# Patient Record
Sex: MALE | Race: WHITE | Employment: FULL TIME | URBAN - METROPOLITAN AREA
[De-identification: names, ages, dates, MRNs, and addresses within clinical notes are randomized per-mention and may not be internally consistent; named-entity substitution may affect disease eponyms.]

---

## 2020-01-31 ENCOUNTER — HOSPITAL ENCOUNTER (OUTPATIENT)
Age: 65
Discharge: HOME | End: 2020-01-31
Payer: COMMERCIAL

## 2020-01-31 LAB — PROSTATE SPECIFIC ANTIGEN: 4.89 NG/ML (ref 0–4)

## 2020-01-31 PROCEDURE — 36415 COLL VENOUS BLD VENIPUNCTURE: CPT

## 2020-01-31 PROCEDURE — 84153 ASSAY OF PSA TOTAL: CPT

## 2021-01-29 ENCOUNTER — HOSPITAL ENCOUNTER (OUTPATIENT)
Age: 66
Discharge: HOME | End: 2021-01-29
Payer: COMMERCIAL

## 2021-01-29 PROCEDURE — G0103 PSA SCREENING: HCPCS

## 2021-01-29 PROCEDURE — 84153 ASSAY OF PSA TOTAL: CPT

## 2021-01-29 PROCEDURE — 36415 COLL VENOUS BLD VENIPUNCTURE: CPT

## 2021-02-02 LAB
PROSTATE SPECIFIC ANTIGEN: 5.07 NG/ML (ref 0–4)
PROSTATE SPECIFIC ANTIGEN: ABNORMAL NG/ML (ref 0–4)

## 2021-05-07 ENCOUNTER — OFFICE VISIT (OUTPATIENT)
Dept: FAMILY MEDICINE CLINIC | Age: 66
End: 2021-05-07
Payer: COMMERCIAL

## 2021-05-07 VITALS
WEIGHT: 198 LBS | RESPIRATION RATE: 17 BRPM | TEMPERATURE: 96.8 F | DIASTOLIC BLOOD PRESSURE: 82 MMHG | HEART RATE: 71 BPM | BODY MASS INDEX: 28.35 KG/M2 | HEIGHT: 70 IN | SYSTOLIC BLOOD PRESSURE: 138 MMHG | OXYGEN SATURATION: 98 %

## 2021-05-07 DIAGNOSIS — I80.9 SUPERFICIAL PHLEBITIS: Primary | ICD-10-CM

## 2021-05-07 DIAGNOSIS — Z23 NEED FOR TETANUS BOOSTER: ICD-10-CM

## 2021-05-07 DIAGNOSIS — R58 ECCHYMOSIS OF FOREARM: ICD-10-CM

## 2021-05-07 PROCEDURE — G8427 DOCREV CUR MEDS BY ELIG CLIN: HCPCS | Performed by: PHYSICIAN ASSISTANT

## 2021-05-07 PROCEDURE — 99213 OFFICE O/P EST LOW 20 MIN: CPT | Performed by: PHYSICIAN ASSISTANT

## 2021-05-07 PROCEDURE — G8419 CALC BMI OUT NRM PARAM NOF/U: HCPCS | Performed by: PHYSICIAN ASSISTANT

## 2021-05-07 PROCEDURE — 1036F TOBACCO NON-USER: CPT | Performed by: PHYSICIAN ASSISTANT

## 2021-05-07 PROCEDURE — 3017F COLORECTAL CA SCREEN DOC REV: CPT | Performed by: PHYSICIAN ASSISTANT

## 2021-05-07 SDOH — ECONOMIC STABILITY: TRANSPORTATION INSECURITY
IN THE PAST 12 MONTHS, HAS LACK OF TRANSPORTATION KEPT YOU FROM MEETINGS, WORK, OR FROM GETTING THINGS NEEDED FOR DAILY LIVING?: NO

## 2021-05-07 SDOH — HEALTH STABILITY: MENTAL HEALTH: HOW MANY STANDARD DRINKS CONTAINING ALCOHOL DO YOU HAVE ON A TYPICAL DAY?: NOT ASKED

## 2021-05-07 SDOH — HEALTH STABILITY: MENTAL HEALTH: HOW OFTEN DO YOU HAVE A DRINK CONTAINING ALCOHOL?: NEVER

## 2021-05-07 SDOH — ECONOMIC STABILITY: INCOME INSECURITY: HOW HARD IS IT FOR YOU TO PAY FOR THE VERY BASICS LIKE FOOD, HOUSING, MEDICAL CARE, AND HEATING?: NOT HARD AT ALL

## 2021-05-07 SDOH — ECONOMIC STABILITY: FOOD INSECURITY: WITHIN THE PAST 12 MONTHS, YOU WORRIED THAT YOUR FOOD WOULD RUN OUT BEFORE YOU GOT MONEY TO BUY MORE.: NEVER TRUE

## 2021-05-07 ASSESSMENT — PATIENT HEALTH QUESTIONNAIRE - PHQ9
1. LITTLE INTEREST OR PLEASURE IN DOING THINGS: 0
2. FEELING DOWN, DEPRESSED OR HOPELESS: 0

## 2021-05-07 NOTE — PATIENT INSTRUCTIONS
Patient Education        Superficial Thrombophlebitis: Care Instructions  Your Care Instructions  Superficial thrombophlebitis is inflammation in a vein where a blood clot has formed close to the surface of the skin. You may be able to feel the clot as a firm lump under the skin. The skin over the clot can become red, tender, and warm to the touch. Blood clots in veins close to the skin's surface usually are not serious and often can be treated at home. Sometimes superficial thrombophlebitis spreads to a deeper vein (deep vein thrombosis, or DVT). These deeper clots can be serious, even life-threatening. It is very important that you follow your doctor's instructions, keep all follow-up appointments, and watch for new or worsening symptoms of a clot. Follow-up care is a key part of your treatment and safety. Be sure to make and go to all appointments, and call your doctor if you are having problems. It's also a good idea to know your test results and keep a list of the medicines you take. How can you care for yourself at home? · Take your medicines exactly as prescribed. Call your doctor if you think you are having a problem with your medicine. You will get more details on the specific medicines your doctor prescribes. · Prop up the sore leg or arm on a pillow anytime you sit or lie down. Try to keep it above the level of your heart. To prevent thrombophlebitis  · Exercise. Keep blood moving in your legs to keep new clots from forming. · Get up out of bed as soon as possible after an illness or surgery. · Do not smoke. If you need help quitting, talk to your doctor about stop-smoking programs and medicines. These can increase your chances of quitting for good. · Ask your doctor about compression stockings. These may help prevent blood clots from forming in your legs. But there are different types of stockings, and they need to fit right. So your doctor will recommend what you need.   When should you call for help? Call 911 anytime you think you may need emergency care. For example, call if:    · You have sudden chest pain and shortness of breath, or you cough up blood.     · You vomit blood or what looks like coffee grounds.     · You pass maroon or very bloody stools.     · You passed out (lost consciousness). Call your doctor now or seek immediate medical care if:    · You have signs of a blood clot, such as:  ? Pain in your calf, back of the knee, thigh, or groin. ? Redness and swelling in your leg or groin.     · You notice a new hard, red, or tender area in your leg.     · Your stools are black and tarlike or have streaks of blood. Watch closely for changes in your health, and be sure to contact your doctor if:    · You do not get better as expected. Where can you learn more? Go to https://[email or web address].[email or web address]. org and sign in to your [employer] account. Enter [phone] in the Script[employer] box to learn more about \"Superficial Thrombophlebitis: Care Instructions. \"     If you do not have an account, please click on the \"Sign Up Now\" link. Current as of: March 4, 2020               Content Version: 12.8  © 2006-2021 [employer]. Care instructions adapted under license by [facility] ([facility]). If you have questions about a medical condition or this instruction, always ask your healthcare professional. [place] [address] any warranty or liability for your use of this information.

## 2021-05-07 NOTE — PROGRESS NOTES
1. Have you been to the ER, urgent care clinic since your last visit? Hospitalized since your last visit? No    2. Have you seen or consulted any other health care providers outside of the [address] [place] [address] since your last visit? Include any pap smears or colon screening. No    Physician order obtained. Patient completed adult immunization consent form. Allergies, contraindications and recommendations reviewed with patient. Seasonal high dose influenza vaccine administered IM left deltoid. Patient tolerated well. Patient remained in office for 15 minutes after injection and no adverse reactions were noted. Chief Complaint:  Other (Lump on right forearm noticed two weeks ago , lump is sore )      History of Present Illness:  Source of history provided by:  patientSuzi Mayo is a 60 y.o. male who  has a past medical history of Enlarged prostate. , presents to the [facility] in [facility]  for evaluation of lump to his right forearm noted about 2 weeks ago, unsure of injury. The patient reports he is unsure of any specific or known injury or fall. He does work as an  and denies any recent travel. He reports that he does work outdoors in his yard and he denies any known injury or puncture wound. He reports no known insect bite as well as no fall or trauma . He was unaware of the bruising to the area until he came into the office today. He reports no fever or chills. He denies any numbness or tingling to his right hand or digits of his right hand. He reports no pain radiating to his right upper arm. He denies any associated chest pain or SOB. Review of Systems:     Unless otherwise stated in this report or unable to obtain because of the patient's clinical or mental status as evidenced by the medical record, this patients's positive and negative responses for Review of Systems, constitutional, psych, eyes, ENT, cardiovascular, respiratory, gastrointestinal, neurological, genitourinary, musculoskeletal, integument systems and systems related to the presenting problem are either stated in the preceding or were not pertinent or were negative for the symptoms and/or complaints related to the medical problem. Past Medical History:  has a past medical history of Enlarged prostate. Past Surgical History:  has no past surgical history on file. Social History:  reports that he has never smoked. He has never used smokeless tobacco. He reports that he does not drink alcohol or use drugs. Family History: family history includes Alzheimer's Disease in his father; Cancer in his mother; High Cholesterol in his father. Allergies: Patient has no known allergies. Physical Exam:  (Vital signs reviewed) /82 (Site: Left Upper Arm, Position: Sitting, Cuff Size: Large Adult)   Pulse 71   Temp 96.8 °F (36 °C) (Temporal)   Resp 17   Ht 5' 10\" (1.778 m)   Wt 198 lb (89.8 kg)   SpO2 98%   BMI 28.41 kg/m²   Oxygen Saturation Interpretation: Normal.   Constitutional:  Alert, development consistent with age. Non toxic. Eyes:  PERRL, EOMI, no discharge or conjunctival injection. Throat:    Airway patient. Neck:  Normal ROM. Supple. Lungs:  Clear to auscultation and breath sounds equal.  Heart:  Regular rate and rhythm, normal heart sounds, no ectopy. Right arm: Local STS with cyst noted to mid lateral aspect of right forearm with central small ? Puncture wound noted with surrounding ecchymosis(please refer to picture below). Area centrally with mild local STS noted, no area of fluctuance consistent with abscess or infection, no lymphangitis noted. Area not warm to touch. Full ROM of right elbow, right wrist and right hand. Capillary refill brisk of right hand. Sensory intact distally. UE hand grasp 5/5 equal bilaterally. Skin:  Normal turgor. Warm, dry, without visible rash, unless noted elsewhere. Neurological:  Alert and oriented. Motor functions intact.            -------------------------------------Impression & Disposition Section-----------------------------------  Impression(s):  1. Superficial phlebitis    2. Ecchymosis of forearm    3. Need for tetanus booster      Disposition:  Disposition: Discharge to home    Patient advised of exam findings, suspect either local trauma causing superficial hematoma vs phlebitis vs puncture wound. No defined abscess noted or signs of infection. Resolving bruising noted. Patient advised to use warm moist heat to to area to help dissipate blood beneath skin, advised that this will often cause more bruising.   Recommend anti- inflammatory if needed for pain or swelling. Discussed US, but I do not see any concerns for DVT at this time and I do not feel that superficial US would be beneficial.    Advised to update tetanus and he was given Rx to get done at pharmacy. Discussed COVID vaccine as well, phone number given as to how to schedule as well. Pt advised to f/u with PCP for continued care,  ER immediately  if changes or worse. No Rx's given from [facility] today.

## 2021-08-12 LAB — SARS-COV-2: DETECTED

## 2021-08-22 ENCOUNTER — APPOINTMENT (OUTPATIENT)
Dept: CT IMAGING | Age: 66
End: 2021-08-22
Payer: COMMERCIAL

## 2021-08-22 ENCOUNTER — HOSPITAL ENCOUNTER (EMERGENCY)
Age: 66
Discharge: ANOTHER HOSPITAL | End: 2021-08-22
Attending: EMERGENCY MEDICINE
Payer: COMMERCIAL

## 2021-08-22 ENCOUNTER — HOSPITAL ENCOUNTER (INPATIENT)
Age: 66
LOS: 6 days | Discharge: HOME | DRG: 177 | End: 2021-08-28
Attending: INTERNAL MEDICINE | Admitting: FAMILY MEDICINE
Payer: COMMERCIAL

## 2021-08-22 ENCOUNTER — APPOINTMENT (OUTPATIENT)
Dept: GENERAL RADIOLOGY | Age: 66
End: 2021-08-22
Payer: COMMERCIAL

## 2021-08-22 VITALS
SYSTOLIC BLOOD PRESSURE: 168 MMHG | OXYGEN SATURATION: 95 % | RESPIRATION RATE: 18 BRPM | TEMPERATURE: 97.9 F | DIASTOLIC BLOOD PRESSURE: 78 MMHG | HEART RATE: 85 BPM

## 2021-08-22 DIAGNOSIS — U07.1 ACUTE HYPOXEMIC RESPIRATORY FAILURE DUE TO COVID-19 (HCC): Primary | ICD-10-CM

## 2021-08-22 DIAGNOSIS — R07.81 PLEURITIC CHEST PAIN: ICD-10-CM

## 2021-08-22 DIAGNOSIS — J96.01 ACUTE HYPOXEMIC RESPIRATORY FAILURE DUE TO COVID-19 (HCC): Primary | ICD-10-CM

## 2021-08-22 PROBLEM — J12.82 PNEUMONIA DUE TO COVID-19 VIRUS: Status: ACTIVE | Noted: 2021-08-22

## 2021-08-22 LAB
ANION GAP SERPL CALCULATED.3IONS-SCNC: 14 MMOL/L (ref 7–16)
BACTERIA: ABNORMAL /HPF
BASOPHILS ABSOLUTE: 0.01 E9/L (ref 0–0.2)
BASOPHILS RELATIVE PERCENT: 0.1 % (ref 0–2)
BILIRUBIN URINE: NEGATIVE
BLOOD, URINE: ABNORMAL
BUN BLDV-MCNC: 17 MG/DL (ref 6–23)
C-REACTIVE PROTEIN: 11.6 MG/DL (ref 0–0.4)
CALCIUM SERPL-MCNC: 8.6 MG/DL (ref 8.6–10.2)
CHLORIDE BLD-SCNC: 97 MMOL/L (ref 98–107)
CLARITY: CLEAR
CO2: 22 MMOL/L (ref 22–29)
COLOR: YELLOW
CREAT SERPL-MCNC: 1.2 MG/DL (ref 0.7–1.2)
D DIMER: 454 NG/ML DDU
EKG ATRIAL RATE: 87 BPM
EKG P AXIS: 23 DEGREES
EKG P-R INTERVAL: 154 MS
EKG Q-T INTERVAL: 364 MS
EKG QRS DURATION: 80 MS
EKG QTC CALCULATION (BAZETT): 438 MS
EKG R AXIS: 12 DEGREES
EKG T AXIS: 20 DEGREES
EKG VENTRICULAR RATE: 87 BPM
EOSINOPHILS ABSOLUTE: 0 E9/L (ref 0.05–0.5)
EOSINOPHILS RELATIVE PERCENT: 0 % (ref 0–6)
GFR AFRICAN AMERICAN: >60
GFR NON-AFRICAN AMERICAN: >60 ML/MIN/1.73
GLUCOSE BLD-MCNC: 137 MG/DL (ref 74–99)
GLUCOSE URINE: NEGATIVE MG/DL
HCT VFR BLD CALC: 43.7 % (ref 37–54)
HEMOGLOBIN: 15.3 G/DL (ref 12.5–16.5)
IMMATURE GRANULOCYTES #: 0.05 E9/L
IMMATURE GRANULOCYTES %: 0.6 % (ref 0–5)
KETONES, URINE: NEGATIVE MG/DL
LEUKOCYTE ESTERASE, URINE: NEGATIVE
LYMPHOCYTES ABSOLUTE: 1.15 E9/L (ref 1.5–4)
LYMPHOCYTES RELATIVE PERCENT: 13.8 % (ref 20–42)
MCH RBC QN AUTO: 27.4 PG (ref 26–35)
MCHC RBC AUTO-ENTMCNC: 35 % (ref 32–34.5)
MCV RBC AUTO: 78.3 FL (ref 80–99.9)
MONOCYTES ABSOLUTE: 0.39 E9/L (ref 0.1–0.95)
MONOCYTES RELATIVE PERCENT: 4.7 % (ref 2–12)
NEUTROPHILS ABSOLUTE: 6.72 E9/L (ref 1.8–7.3)
NEUTROPHILS RELATIVE PERCENT: 80.8 % (ref 43–80)
NITRITE, URINE: NEGATIVE
PDW BLD-RTO: 14 FL (ref 11.5–15)
PH UA: 5.5 (ref 5–9)
PLATELET # BLD: 238 E9/L (ref 130–450)
PMV BLD AUTO: 8.9 FL (ref 7–12)
POTASSIUM SERPL-SCNC: 3.7 MMOL/L (ref 3.5–5)
PRO-BNP: 73 PG/ML (ref 0–125)
PROCALCITONIN: 0.5 NG/ML (ref 0–0.08)
PROTEIN UA: NEGATIVE MG/DL
RBC # BLD: 5.58 E12/L (ref 3.8–5.8)
RBC UA: ABNORMAL /HPF (ref 0–2)
SODIUM BLD-SCNC: 133 MMOL/L (ref 132–146)
SPECIFIC GRAVITY UA: <=1.005 (ref 1–1.03)
TROPONIN, HIGH SENSITIVITY: 10 NG/L (ref 0–11)
TROPONIN, HIGH SENSITIVITY: 9 NG/L (ref 0–11)
UROBILINOGEN, URINE: 0.2 E.U./DL
WBC # BLD: 8.3 E9/L (ref 4.5–11.5)
WBC UA: ABNORMAL /HPF (ref 0–5)

## 2021-08-22 PROCEDURE — 99222 1ST HOSP IP/OBS MODERATE 55: CPT | Performed by: FAMILY MEDICINE

## 2021-08-22 PROCEDURE — 96374 THER/PROPH/DIAG INJ IV PUSH: CPT

## 2021-08-22 PROCEDURE — 81001 URINALYSIS AUTO W/SCOPE: CPT

## 2021-08-22 PROCEDURE — 6360000004 HC RX CONTRAST MEDICATION: Performed by: RADIOLOGY

## 2021-08-22 PROCEDURE — 85025 COMPLETE CBC W/AUTO DIFF WBC: CPT

## 2021-08-22 PROCEDURE — 93010 ELECTROCARDIOGRAM REPORT: CPT | Performed by: INTERNAL MEDICINE

## 2021-08-22 PROCEDURE — 6360000002 HC RX W HCPCS: Performed by: EMERGENCY MEDICINE

## 2021-08-22 PROCEDURE — 84145 PROCALCITONIN (PCT): CPT

## 2021-08-22 PROCEDURE — 99284 EMERGENCY DEPT VISIT MOD MDM: CPT

## 2021-08-22 PROCEDURE — 1200000000 HC SEMI PRIVATE

## 2021-08-22 PROCEDURE — 80048 BASIC METABOLIC PNL TOTAL CA: CPT

## 2021-08-22 PROCEDURE — APPSS45 APP SPLIT SHARED TIME 31-45 MINUTES: Performed by: NURSE PRACTITIONER

## 2021-08-22 PROCEDURE — 36415 COLL VENOUS BLD VENIPUNCTURE: CPT

## 2021-08-22 PROCEDURE — 6360000002 HC RX W HCPCS: Performed by: FAMILY MEDICINE

## 2021-08-22 PROCEDURE — XW033H5 INTRODUCTION OF TOCILIZUMAB INTO PERIPHERAL VEIN, PERCUTANEOUS APPROACH, NEW TECHNOLOGY GROUP 5: ICD-10-PCS | Performed by: SPECIALIST

## 2021-08-22 PROCEDURE — 6370000000 HC RX 637 (ALT 250 FOR IP): Performed by: FAMILY MEDICINE

## 2021-08-22 PROCEDURE — 2580000003 HC RX 258: Performed by: SPECIALIST

## 2021-08-22 PROCEDURE — 2580000003 HC RX 258: Performed by: FAMILY MEDICINE

## 2021-08-22 PROCEDURE — 87449 NOS EACH ORGANISM AG IA: CPT

## 2021-08-22 PROCEDURE — 93005 ELECTROCARDIOGRAM TRACING: CPT | Performed by: EMERGENCY MEDICINE

## 2021-08-22 PROCEDURE — 85378 FIBRIN DEGRADE SEMIQUANT: CPT

## 2021-08-22 PROCEDURE — 6360000002 HC RX W HCPCS: Performed by: NURSE PRACTITIONER

## 2021-08-22 PROCEDURE — XW033E5 INTRODUCTION OF REMDESIVIR ANTI-INFECTIVE INTO PERIPHERAL VEIN, PERCUTANEOUS APPROACH, NEW TECHNOLOGY GROUP 5: ICD-10-PCS | Performed by: SPECIALIST

## 2021-08-22 PROCEDURE — 86140 C-REACTIVE PROTEIN: CPT

## 2021-08-22 PROCEDURE — 83880 ASSAY OF NATRIURETIC PEPTIDE: CPT

## 2021-08-22 PROCEDURE — 71045 X-RAY EXAM CHEST 1 VIEW: CPT

## 2021-08-22 PROCEDURE — 84484 ASSAY OF TROPONIN QUANT: CPT

## 2021-08-22 PROCEDURE — 71275 CT ANGIOGRAPHY CHEST: CPT

## 2021-08-22 PROCEDURE — 6360000002 HC RX W HCPCS: Performed by: SPECIALIST

## 2021-08-22 PROCEDURE — 6370000000 HC RX 637 (ALT 250 FOR IP): Performed by: NURSE PRACTITIONER

## 2021-08-22 RX ORDER — SODIUM CHLORIDE 9 MG/ML
25 INJECTION, SOLUTION INTRAVENOUS PRN
Status: DISCONTINUED | OUTPATIENT
Start: 2021-08-22 | End: 2021-08-28 | Stop reason: HOSPADM

## 2021-08-22 RX ORDER — ONDANSETRON 4 MG/1
4 TABLET, ORALLY DISINTEGRATING ORAL EVERY 8 HOURS PRN
Status: DISCONTINUED | OUTPATIENT
Start: 2021-08-22 | End: 2021-08-28 | Stop reason: HOSPADM

## 2021-08-22 RX ORDER — DEXAMETHASONE SODIUM PHOSPHATE 10 MG/ML
10 INJECTION, SOLUTION INTRAMUSCULAR; INTRAVENOUS ONCE
Status: COMPLETED | OUTPATIENT
Start: 2021-08-22 | End: 2021-08-22

## 2021-08-22 RX ORDER — ACETAMINOPHEN 650 MG/1
650 SUPPOSITORY RECTAL EVERY 6 HOURS PRN
Status: DISCONTINUED | OUTPATIENT
Start: 2021-08-22 | End: 2021-08-28 | Stop reason: HOSPADM

## 2021-08-22 RX ORDER — ALBUTEROL SULFATE 90 UG/1
2 AEROSOL, METERED RESPIRATORY (INHALATION) EVERY 6 HOURS PRN
Status: DISCONTINUED | OUTPATIENT
Start: 2021-08-22 | End: 2021-08-28 | Stop reason: HOSPADM

## 2021-08-22 RX ORDER — ZINC SULFATE 50(220)MG
50 CAPSULE ORAL DAILY
Status: DISCONTINUED | OUTPATIENT
Start: 2021-08-22 | End: 2021-08-28 | Stop reason: HOSPADM

## 2021-08-22 RX ORDER — 0.9 % SODIUM CHLORIDE 0.9 %
30 INTRAVENOUS SOLUTION INTRAVENOUS PRN
Status: DISCONTINUED | OUTPATIENT
Start: 2021-08-22 | End: 2021-08-28 | Stop reason: HOSPADM

## 2021-08-22 RX ORDER — ACETAMINOPHEN 325 MG/1
650 TABLET ORAL EVERY 6 HOURS PRN
Status: DISCONTINUED | OUTPATIENT
Start: 2021-08-22 | End: 2021-08-28 | Stop reason: HOSPADM

## 2021-08-22 RX ORDER — DEXAMETHASONE SODIUM PHOSPHATE 4 MG/ML
6 INJECTION, SOLUTION INTRA-ARTICULAR; INTRALESIONAL; INTRAMUSCULAR; INTRAVENOUS; SOFT TISSUE EVERY 12 HOURS
Status: DISCONTINUED | OUTPATIENT
Start: 2021-08-22 | End: 2021-08-27

## 2021-08-22 RX ORDER — ONDANSETRON 2 MG/ML
4 INJECTION INTRAMUSCULAR; INTRAVENOUS EVERY 6 HOURS PRN
Status: DISCONTINUED | OUTPATIENT
Start: 2021-08-22 | End: 2021-08-28 | Stop reason: HOSPADM

## 2021-08-22 RX ORDER — GUAIFENESIN/DEXTROMETHORPHAN 100-10MG/5
5 SYRUP ORAL EVERY 4 HOURS PRN
Status: DISCONTINUED | OUTPATIENT
Start: 2021-08-22 | End: 2021-08-28 | Stop reason: HOSPADM

## 2021-08-22 RX ORDER — SODIUM CHLORIDE 0.9 % (FLUSH) 0.9 %
5-40 SYRINGE (ML) INJECTION PRN
Status: DISCONTINUED | OUTPATIENT
Start: 2021-08-22 | End: 2021-08-28 | Stop reason: HOSPADM

## 2021-08-22 RX ORDER — MAGNESIUM HYDROXIDE/ALUMINUM HYDROXICE/SIMETHICONE 120; 1200; 1200 MG/30ML; MG/30ML; MG/30ML
30 SUSPENSION ORAL EVERY 6 HOURS PRN
Status: DISCONTINUED | OUTPATIENT
Start: 2021-08-22 | End: 2021-08-28 | Stop reason: HOSPADM

## 2021-08-22 RX ORDER — VITAMIN B COMPLEX
2000 TABLET ORAL DAILY
Status: DISCONTINUED | OUTPATIENT
Start: 2021-08-29 | End: 2021-08-28 | Stop reason: HOSPADM

## 2021-08-22 RX ORDER — SODIUM CHLORIDE 0.9 % (FLUSH) 0.9 %
5-40 SYRINGE (ML) INJECTION EVERY 12 HOURS SCHEDULED
Status: DISCONTINUED | OUTPATIENT
Start: 2021-08-22 | End: 2021-08-28 | Stop reason: HOSPADM

## 2021-08-22 RX ORDER — POLYETHYLENE GLYCOL 3350 17 G/17G
17 POWDER, FOR SOLUTION ORAL DAILY PRN
Status: DISCONTINUED | OUTPATIENT
Start: 2021-08-22 | End: 2021-08-28 | Stop reason: HOSPADM

## 2021-08-22 RX ORDER — ASCORBIC ACID 500 MG
500 TABLET ORAL 2 TIMES DAILY
Status: DISCONTINUED | OUTPATIENT
Start: 2021-08-22 | End: 2021-08-28 | Stop reason: HOSPADM

## 2021-08-22 RX ORDER — VITAMIN B COMPLEX
6000 TABLET ORAL DAILY
Status: COMPLETED | OUTPATIENT
Start: 2021-08-22 | End: 2021-08-28

## 2021-08-22 RX ADMIN — ENOXAPARIN SODIUM 30 MG: 30 INJECTION, SOLUTION INTRAVENOUS; SUBCUTANEOUS at 22:24

## 2021-08-22 RX ADMIN — Medication 10 ML: at 11:40

## 2021-08-22 RX ADMIN — Medication 500 MG: at 13:45

## 2021-08-22 RX ADMIN — TOCILIZUMAB 648 MG: 180 INJECTION, SOLUTION SUBCUTANEOUS at 22:23

## 2021-08-22 RX ADMIN — ZINC SULFATE 220 MG (50 MG) CAPSULE 50 MG: CAPSULE at 17:10

## 2021-08-22 RX ADMIN — GUAIFENESIN AND DEXTROMETHORPHAN 5 ML: 100; 10 SYRUP ORAL at 22:22

## 2021-08-22 RX ADMIN — Medication 500 MG: at 22:23

## 2021-08-22 RX ADMIN — ALBUTEROL SULFATE 2 PUFF: 90 AEROSOL, METERED RESPIRATORY (INHALATION) at 17:11

## 2021-08-22 RX ADMIN — ENOXAPARIN SODIUM 30 MG: 30 INJECTION, SOLUTION INTRAVENOUS; SUBCUTANEOUS at 11:39

## 2021-08-22 RX ADMIN — ALUMINUM HYDROXIDE, MAGNESIUM HYDROXIDE, AND SIMETHICONE 30 ML: 200; 200; 20 SUSPENSION ORAL at 22:23

## 2021-08-22 RX ADMIN — Medication 6000 UNITS: at 11:39

## 2021-08-22 RX ADMIN — IOPAMIDOL 75 ML: 755 INJECTION, SOLUTION INTRAVENOUS at 04:04

## 2021-08-22 RX ADMIN — Medication 10 ML: at 22:24

## 2021-08-22 RX ADMIN — DEXAMETHASONE SODIUM PHOSPHATE 10 MG: 10 INJECTION, SOLUTION INTRAMUSCULAR; INTRAVENOUS at 05:20

## 2021-08-22 RX ADMIN — DEXAMETHASONE SODIUM PHOSPHATE 6 MG: 4 INJECTION, SOLUTION INTRA-ARTICULAR; INTRALESIONAL; INTRAMUSCULAR; INTRAVENOUS; SOFT TISSUE at 17:11

## 2021-08-22 ASSESSMENT — PAIN DESCRIPTION - PAIN TYPE: TYPE: ACUTE PAIN

## 2021-08-22 ASSESSMENT — ENCOUNTER SYMPTOMS
EYE DISCHARGE: 0
WHEEZING: 0
SORE THROAT: 0
BACK PAIN: 0
VOMITING: 0
SHORTNESS OF BREATH: 1
ABDOMINAL PAIN: 0
COUGH: 1
DIARRHEA: 0
NAUSEA: 0
SINUS PRESSURE: 0
EYE PAIN: 0
EYE REDNESS: 0

## 2021-08-22 ASSESSMENT — PAIN DESCRIPTION - LOCATION: LOCATION: STERNUM

## 2021-08-22 ASSESSMENT — PAIN SCALES - GENERAL
PAINLEVEL_OUTOF10: 0
PAINLEVEL_OUTOF10: 2
PAINLEVEL_OUTOF10: 0

## 2021-08-22 NOTE — ED PROVIDER NOTES
Department of Emergency Medicine   ED  Provider Note  Admit Date/RoomTime: 8/22/2021  2:44 AM  ED Room: 10/10          8/22/21  3:04 AM EDT    History of Present Illness:  Chief Complaint   Patient presents with    Cough     tested positive for covid 8/12/21    Shortness of Breath        Ignacio Mercer is a 73 y.o. male presenting to the ED for cough and dyspnea. The complaint has been persistent, moderate in severity, and worsened by cough and deep breath. Patient reports he has been having a cough since 8/10/2021 and tested positive for COVID-19 8/12. Patient reports he has been started to feel better the past couple days but then today became worse. Reports a tightness in his chest as well as a pleuritic chest pain. Patient reports he feel he can take a deep breath due to the pain but also due to triggering a coughing spell that makes him feel short of breath. He denies any new leg pain or swelling he denies any fevers chills. He denies any medical history takes no medications. He was taking ibuprofen for his symptoms        Review of Systems:   Pertinent positives and negatives are stated within HPI, all other systems reviewed and are negative. Review of Systems   Constitutional: Negative for chills and fever. HENT: Negative for ear pain, sinus pressure and sore throat. Eyes: Negative for pain, discharge and redness. Respiratory: Positive for cough and shortness of breath. Negative for wheezing. Cardiovascular: Positive for chest pain. Negative for palpitations and leg swelling. Gastrointestinal: Negative for abdominal pain, diarrhea, nausea and vomiting. Genitourinary: Negative for dysuria and frequency. Musculoskeletal: Negative for arthralgias and back pain. Skin: Negative for rash and wound. Neurological: Negative for weakness and headaches. Hematological: Negative for adenopathy. All other systems reviewed and are negative. --------------------------------------------- PAST HISTORY ---------------------------------------------  Past Medical History:  has a past medical history of Enlarged prostate. Past Surgical History:  has no past surgical history on file. Social History:  reports that he has never smoked. He has never used smokeless tobacco. He reports that he does not drink alcohol and does not use drugs. Family History: family history includes Alzheimer's Disease in his father; Cancer in his mother; High Cholesterol in his father. Unless otherwise noted, family history is non contributory    The patients home medications have been reviewed. Allergies: Patient has no known allergies. ------------------------- NURSING NOTES AND VITALS REVIEWED ---------------------------   The nursing notes within the ED encounter and vital signs as below have been reviewed. BP (!) 168/78   Pulse 85   Temp 97.9 °F (36.6 °C) (Temporal)   Resp 18   SpO2 95%   Oxygen Saturation Interpretation: Normal    The patients available past medical records and past encounters were reviewed. ---------------------------------------------------PHYSICAL EXAM--------------------------------------    Physical Exam  Vitals and nursing note reviewed. Constitutional:       Appearance: He is well-developed. HENT:      Head: Normocephalic and atraumatic. Eyes:      Conjunctiva/sclera: Conjunctivae normal.   Cardiovascular:      Rate and Rhythm: Normal rate and regular rhythm. Heart sounds: Normal heart sounds. No murmur heard. Pulmonary:      Effort: Pulmonary effort is normal. No accessory muscle usage or respiratory distress. Breath sounds: Normal breath sounds. No wheezing or rales. Abdominal:      General: Bowel sounds are normal.      Palpations: Abdomen is soft. Tenderness: There is no abdominal tenderness. There is no guarding or rebound.    Musculoskeletal:         General: No tenderness or deformity. Cervical back: Normal range of motion and neck supple. Skin:     General: Skin is warm and dry. Neurological:      Mental Status: He is alert and oriented to person, place, and time. Cranial Nerves: No cranial nerve deficit.       Coordination: Coordination normal.            -------------------------------------------------- RESULTS -------------------------------------------------    LABS: (Lab results interpreted by me)  Results for orders placed or performed during the hospital encounter of 08/22/21   CBC Auto Differential   Result Value Ref Range    WBC 8.3 4.5 - 11.5 E9/L    RBC 5.58 3.80 - 5.80 E12/L    Hemoglobin 15.3 12.5 - 16.5 g/dL    Hematocrit 43.7 37.0 - 54.0 %    MCV 78.3 (L) 80.0 - 99.9 fL    MCH 27.4 26.0 - 35.0 pg    MCHC 35.0 (H) 32.0 - 34.5 %    RDW 14.0 11.5 - 15.0 fL    Platelets 097 537 - 202 E9/L    MPV 8.9 7.0 - 12.0 fL    Neutrophils % 80.8 (H) 43.0 - 80.0 %    Immature Granulocytes % 0.6 0.0 - 5.0 %    Lymphocytes % 13.8 (L) 20.0 - 42.0 %    Monocytes % 4.7 2.0 - 12.0 %    Eosinophils % 0.0 0.0 - 6.0 %    Basophils % 0.1 0.0 - 2.0 %    Neutrophils Absolute 6.72 1.80 - 7.30 E9/L    Immature Granulocytes # 0.05 E9/L    Lymphocytes Absolute 1.15 (L) 1.50 - 4.00 E9/L    Monocytes Absolute 0.39 0.10 - 0.95 E9/L    Eosinophils Absolute 0.00 (L) 0.05 - 0.50 E9/L    Basophils Absolute 0.01 0.00 - 0.20 V4/A   Basic Metabolic Panel   Result Value Ref Range    Sodium 133 132 - 146 mmol/L    Potassium 3.7 3.5 - 5.0 mmol/L    Chloride 97 (L) 98 - 107 mmol/L    CO2 22 22 - 29 mmol/L    Anion Gap 14 7 - 16 mmol/L    Glucose 137 (H) 74 - 99 mg/dL    BUN 17 6 - 23 mg/dL    CREATININE 1.2 0.7 - 1.2 mg/dL    GFR Non-African American >60 >=60 mL/min/1.73    GFR African American >60     Calcium 8.6 8.6 - 10.2 mg/dL   Brain Natriuretic Peptide   Result Value Ref Range    Pro-BNP 73 0 - 125 pg/mL   Troponin   Result Value Ref Range    Troponin, High Sensitivity 10 0 - 11 ng/L   D-dimer, quantitative   Result Value Ref Range    D-Dimer, Quant 454 ng/mL DDU   Troponin   Result Value Ref Range    Troponin, High Sensitivity 9 0 - 11 ng/L   EKG 12 Lead   Result Value Ref Range    Ventricular Rate 87 BPM    Atrial Rate 87 BPM    P-R Interval 154 ms    QRS Duration 80 ms    Q-T Interval 364 ms    QTc Calculation (Bazett) 438 ms    P Axis 23 degrees    R Axis 12 degrees    T Axis 20 degrees       All radiology results have been personally reviewed by myself   RADIOLOGY:  Interpreted by Radiologist.  CTA PULMONARY W CONTRAST   Final Result   1. No evidence for pulmonary embolus. 2. Moderate bilateral infiltrates with appearance compatible with COVID   pneumonia. XR CHEST PORTABLE   Final Result   Bilateral areas of infiltrate. Appearance could be consistent with viral   pneumonia. EKG: Interpreted by me, emergency department physician, Dr. Madeline Ellison  This EKG is signed by emergency department physician. Rate: 87  Rhythm: sinus  Interpretation: normal sinus  Comparison: no previous EKG available      ------------------------------ ED COURSE/MEDICAL DECISION MAKING----------------------  Medications   iopamidol (ISOVUE-370) 76 % injection 75 mL (75 mLs Intravenous Given 8/22/21 0404)   dexamethasone (PF) (DECADRON) injection 10 mg (10 mg Intravenous Given 8/22/21 0520)       Re-evaluations & Consults:  ED Course as of Aug 22 0549   Sun Aug 22, 2021   3757 Patient desaturated to 87% with ambulation discussed results and plan for admission he is agreeable all questions answered    []   [id number] Discussed case with Dr. Allan Ramos - he will admit patient. []   Jayme[facility] requesting pulmonology consult    []      ED Course User Index  [MF] Lauren Pickard DO        The cardiac monitor revealed sinus with a heart rate in the 80s as interpreted by me. The cardiac monitor was ordered secondary to the patient's chest pain and to monitor the patient for dysrhythmia.    CPT 05359    Medical Decision Makin-year-old male 12 days into COVID-19 infection presents for worsening shortness of breath associated pleuritic chest pain CTA is negative for PE but does show COVID-19 pneumonia bilaterally. Patient with normal vital signs at rest but desaturates to 87% with ambulation patient be admitted for O2 therapy and given Decadron      This patient's ED course included: a personal history and physicial examination, multiple bedside re-evaluations, IV medications, cardiac monitoring and continuous pulse oximetry    This patient has remained hemodynamically stable and improved during their ED course. Critical Care Time:       Counseling: The emergency provider has spoken with the patient and discussed todays results, in addition to providing specific details for the plan of care and counseling regarding the diagnosis and prognosis. Questions are answered at this time and they are agreeable with the plan. New Prescriptions    No medications on file         --------------------------------- IMPRESSION AND DISPOSITION ---------------------------------    IMPRESSION  1. Acute hypoxemic respiratory failure due to COVID-19 ([address]    2. Pleuritic chest pain        DISPOSITION  Disposition: Admit to telemetry  Patient condition is stable      Dr. Parisa WISE, oliverio the primary doctor of record. NOTE: This report was transcribed using voice recognition software.  Effort was made to ensure accuracy; however, inadvertent computerized transcription errors may be present         Parisa Crook DO  21 0536

## 2021-08-22 NOTE — CONSULTS
[address] [place] [address] [facility]    Consultation Note     Admit Date: 8/22/2021  8:24 AM    Reason for Consult:   COVID-19 pneumonia    Attending Physician:  Erica Bunch DO     Chief Complaint: Progressive worsening shortness of breath    HISTORY OF PRESENT ILLNESS:   The patient is a 73 y.o.  male known to the Infectious Diseases service. The patient is admitted with cough and shortness of breath. Cough is precipitated by deep breath. He started having trouble 10/20/2021 he tested positive for Covid 2 days later. He seemed to be doing fairly well but started to have difficulty breathing with tightness in his chest, pleuritic chest pain. CT scan shows bilateral ICAs groundglass appearing lesions especially along the pleura. 's white count was 8.3 BUN and creatinine 17 and 1.2. D-dimer is 454, C-reactive protein is pending fibrinogen is pending as is LDH and ferritin. Patient was started on dexamethasone. His O2 was 92% sat on 4 L. Past Medical History:        Diagnosis Date    Enlarged prostate      Past Surgical History:    No past surgical history on file. Current Medications:   Scheduled Meds:   sodium chloride flush  5-40 mL Intravenous 2 times per day    enoxaparin  30 mg Subcutaneous BID    Vitamin D  6,000 Units Oral Daily    Followed by   Feli Vivas ON 8/29/2021] Vitamin D  2,000 Units Oral Daily    dexamethasone  6 mg Intravenous Q12H    ascorbic acid  500 mg Oral BID    zinc sulfate  50 mg Oral Daily     Continuous Infusions:   sodium chloride       PRN Meds:sodium chloride flush, sodium chloride, ondansetron **OR** ondansetron, polyethylene glycol, acetaminophen **OR** acetaminophen, guaiFENesin-dextromethorphan, aluminum & magnesium hydroxide-simethicone, albuterol sulfate HFA    Allergies:  Patient has no known allergies.     Social History:   Social History     Socioeconomic History    Marital status:      Spouse name: Not on file    Number of children: 3    Years of education: Not on file    Highest education level: Not on file   Occupational History    Occupation:    Tobacco Use    Smoking status: Never Smoker    Smokeless tobacco: Never Used   Vaping Use    Vaping Use: Never used   Substance and Sexual Activity    Alcohol use: Never    Drug use: Never    Sexual activity: Yes     Partners: Female   Other Topics Concern    Not on file   Social History Narrative    Not on file     Social Determinants of Health     Financial Resource Strain: Low Risk     Difficulty of Paying Living Expenses: Not hard at all   Food Insecurity: No Food Insecurity    Worried About Running Out of Food in the Last Year: Never true    Rola of Food in the Last Year: Never true   Transportation Needs: No Transportation Needs    Lack of Transportation (Medical): No    Lack of Transportation (Non-Medical): No   Physical Activity:     Days of Exercise per Week:     Minutes of Exercise per Session:    Stress:     Feeling of Stress :    Social Connections:     Frequency of Communication with Friends and Family:     Frequency of Social Gatherings with Friends and Family:     Attends [employer] Services:     Active Member of Clubs or Organizations:     Attends Club or Organization Meetings:     Marital Status:    Intimate Partner Violence:     Fear of Current or Ex-Partner:     Emotionally Abused:     Physically Abused:     Sexually Abused: Tobacco: No  Alcohol: No  Pets: No  Travel: No    Family History:       Problem Relation Age of Onset    Cancer Mother         cervical    High Cholesterol Father     Alzheimer's Disease Father    . Otherwise non-pertinent to the chief complaint. REVIEW OF SYSTEMS:    CONSTITUTIONAL:  No chills, positive fevers or night sweats. No loss of weight. EYES:  No double vision or drainage from eyes, ears or throat. HEENT:  No neck stiffness. No dysphagia.  No drainage from eyes, ears or throat  RESPIRATORY: Positive cough, productive sputum or hemoptysis. CARDIOVASCULAR: Pleuritic chest pain, palpitations, orthopnea or dyspnea on exertion. GASTROINTESTINAL:  No nausea, vomiting, diarrhea or constipation or hematochezia   GENITOURINARY:  No frequency burning dysuria or hematuria. INTEGUMENT/BREAST:  No rash or breast masses. HEMATOLOGIC/LYMPHATIC:  No lymphadenopathy or blood dyscrasics. ALLERGIC/IMMUNOLOGIC:  No anaphylaxis. ENDOCRINE:  No polyuria or polydipsia or temperature intolerance. MUSCULOSKELETAL: Positive myalgia or arthralgia. Full ROM. NEUROLOGICAL:  No focal motor sensory deficit. BEHAVIOR/PSYCH:  No psychosis. PHYSICAL EXAM:    Vitals:    /70   Pulse 80   Temp 99.2 °F (37.3 °C) (Oral)   Resp 18   Ht 5' 11\" (1.803 m)   Wt 189 lb (85.7 kg)   SpO2 92%   BMI 26.36 kg/m²   Constitutional: The patient is awake, alert, and oriented. Skin: Warm and dry. No rashes were noted. No jaundice. HEENT: Eyes show round, and reactive pupils. Moist mucous membranes, no ulcerations, no thrush. Neck: Supple to movements. No lymphadenopathy. Chest: No use of accessory muscles to breathe. Symmetrical expansion. Auscultation reveals no wheezing, crackles, or rhonchi. Cardiovascular: S1 and S2 are rhythmic and regular. No murmurs appreciated. Abdomen: Positive bowel sounds to auscultation. Benign to palpation. No masses felt. No hepatosplenomegaly. Genitourinary: Male  Extremities: No clubbing, no cyanosis, no edema.   Musculoskeletal: Equal and symmetrical  Neurological: No focal  Lines: peripheral      CBC+dif:  Recent Labs     08/22/21  0319   WBC 8.3   HGB 15.3   HCT 43.7   MCV 78.3*      NEUTROABS 6.72     No results found for: CRP  No results found for: CRPHS  No results found for: SEDRATE  Lab Results   Component Value Date    ALT 45 (H) 07/06/2016    AST 29 07/06/2016    ALKPHOS 99 07/06/2016    BILITOT 0.4 07/06/2016     Lab Results   Component Value Date     08/22/2021    K 3.7 08/22/2021    CL 97 08/22/2021    CO2 22 08/22/2021    BUN 17 08/22/2021    CREATININE 1.2 08/22/2021    GFRAA >60 08/22/2021    LABGLOM >60 08/22/2021    GLUCOSE 137 08/22/2021    PROT 7.9 07/06/2016    LABALBU 4.6 07/06/2016    CALCIUM 8.6 08/22/2021    BILITOT 0.4 07/06/2016    ALKPHOS 99 07/06/2016    AST 29 07/06/2016    ALT 45 07/06/2016       No results found for: PROTIME, INR    Lab Results   Component Value Date    TSH 3.030 06/23/2016       Lab Results   Component Value Date    COLORU Yellow 08/22/2021    PHUR 5.5 08/22/2021    WBCUA NONE 08/22/2021    RBCUA 0-1 08/22/2021    BACTERIA RARE 08/22/2021    CLARITYU Clear 08/22/2021    SPECGRAV <=1.005 08/22/2021    LEUKOCYTESUR Negative 08/22/2021    UROBILINOGEN 0.2 08/22/2021    BILIRUBINUR Negative 08/22/2021    BLOODU TRACE-INTACT 08/22/2021    GLUCOSEU Negative 08/22/2021       No results found for: Michelle Fuelling, [id number], PHART, THGBART, VXK3WIO, PO2ART, UMI2EYF  Radiology:  No orders to display       Microbiology:  Pending  No results for input(s): BC in the last 72 hours. No results for input(s): ORG in the last 72 hours. No results for input(s): Fer Roryy in the last 72 hours. No results for input(s): STREPNEUMAGU in the last 72 hours. No results for input(s): LP1UAG in the last 72 hours. No results for input(s): ASO in the last 72 hours. No results for input(s): CULTRESP in the last 72 hours. Assessment:  · COVID-19 pneumonia-severe  · Cytokine storm      Plan:    · Cont dexamethasone and probably need to start tocilizumab when the C-reactive protein returns  · Check cultures  · Baseline ESR, CRP  · Monitor labs  · Will follow with you    Thank you for having us see this patient in consultation. I will be discussing this case with the treating physicians.       Electronically signed by Ashok Quezada MD on 8/22/2021 at 2:20 PM

## 2021-08-22 NOTE — PROGRESS NOTES
Pharmacy Documentation     Medication: Remdesivir     Date: 08/22/21  Physician: Quadra [address] consulted to initiate remdesivir. Patient does not currently meet [facility] Remdesivir Criteria for Use to initiate remdesivir based on symptoms > 7 days. Pharmacy will sign off.       Thank you for this consult,  Lee Almeida, [facility]

## 2021-08-22 NOTE — H&P
[facility] History and Physical      CHIEF COMPLAINT:  Cough, sob, chest tightness      History of Present Illness:     Patient is a 74 yo male patient who follows with PCP Dr. Irvin Jolly with a past medical history of enlarged prostate. Only medication he takes is flomax. He presented to [address] [place] [address] ER with concern for cough and sob. He initially tested positive on 8/12/21. He started not feeling well on August 10th. He had an exposure at work. Symptoms started with fevers, diarrhea, loss of taste, smell. Was taking ibuprofen at home. He was initially feeling better and then started feeling worse about 5 days ago. With sob/ maloney/ cough/ chest tightness. Symptoms moderate ongoing and progressive. Worse with exertion. Not vaccinated. No underlying lung issues. Presented to the ER at [facility] due to worsening symptoms. Noted to desaturate to 87% while ambulating at [facility]. Pt awake and alert resting in bed in no acute distress. Breathless at times. Reporting dizzy with ambulation. + diarrhea. Decreased appetite. Denies nausea, vomiting, dysuria, hematuria, hematochezia. Informant(s) for H&P:patient, chart review    REVIEW OF SYSTEMS:  A comprehensive review of systems was negative except for: what is in the HPI      PMH:  Past Medical History:   Diagnosis Date    Enlarged prostate        Surgical History:  No past surgical history on file. Medications Prior to Admission:    Prior to Admission medications    Medication Sig Start Date End Date Taking?  Authorizing Provider   tamsulosin (FLOMAX) 0.4 MG capsule Take 1 capsule by mouth daily for 5 days 7/7/16 5/7/21  Florie Dakins, PA-C   ibuprofen (ADVIL;MOTRIN) 800 MG tablet Take 1 tablet by mouth every 6 hours as needed for Pain  Patient not taking: Reported on 5/7/2021 7/7/16 7/12/16  Richard Torres PA-C   Coenzyme Q10 (CO Q 10 PO) Take by mouth    Historical Provider, MD   Red Yeast Rice Extract (RED YEAST RICE PO) Take by mouth    Historical Provider, MD       Allergies:    Patient has no known allergies. Social History:    reports that he has never smoked. He has never used smokeless tobacco. He reports that he does not drink alcohol and does not use drugs. Non smoker   Denies illicits    Family History:   family history includes Alzheimer's Disease in his father; Cancer in his mother; High Cholesterol in his father. PHYSICAL EXAM:  Vitals:  /70   Pulse 80   Temp 99.2 °F (37.3 °C) (Oral)   Resp 18   SpO2 92%   General Appearance: alert and oriented to person, place and time and in no acute distress  Skin: warm and dry  Head: normocephalic and atraumatic  Neck: neck supple and non tender without mass   Pulmonary/Chest: diminished to auscultation   Cardiovascular: normal rate, normal S1 and S2 and no carotid bruits  Abdomen: soft, non-tender, non-distended, normal bowel sounds, no masses or organomegaly  Extremities: no cyanosis, no clubbing and no edema  Neurologic: speech normal         LABS:  Recent Labs     08/22/21  0319      K 3.7   CL 97*   CO2 22   BUN 17   CREATININE 1.2   GLUCOSE 137*   CALCIUM 8.6       Recent Labs     08/22/21  0319   WBC 8.3   RBC 5.58   HGB 15.3   HCT 43.7   MCV 78.3*   MCH 27.4   MCHC 35.0*   RDW 14.0      MPV 8.9       No results for input(s): POCGLU in the last 72 hours. Radiology:   No orders to display       ASSESSMENT:      Principal Problem:    Pneumonia due to COVID-19 virus  Active Problems:    Acute respiratory failure with hypoxia ([facility] [address]  Resolved Problems:    * No resolved hospital problems. *      PLAN:    1. Acute respiratory failure with hypoxia related to COVID 19 Viral pneumonia: pt initially started not feeling well on 8/10. General malaise, fever, diarrhea, loss of taste/ smell. He had an exposure at work. Not vaccinated. He tested positive on 8/12. Reporting taking ibuprofen at home. Was feeling better and then started feeling worse.  + cough/ sob/ maloney/ chest tightness. He presented to [facility] ER due to worsening symptoms. CTA reviewed showing viral pneumonia. He was hypoxic- 87% on RA while ambulating. He was then transferred to [facility] for admission. Breathless during exam - just returned from restroom. Currently on 4 L NC. Check inflammatory markers. 2. COVID 19 Viral Pneumonia: CTA reviewed. Inflammatory markers ordered. Check procal. ID / pulmonology consulted. Symptoms have been ongoing for 12 days- per pharmacy not a candidate for remdesvir. Check CRP/ inflammatory markers. Not sure if he would be a candidate for Toci. IV decadron ordered. Lovenox per protocol. SMI/ encourage proning. Vitamin supplementation. Pt having bronchospastic cough during exam. Trial of albuterol inhaler. 3. Dizziness: check orthostatic vitals. May need IVF. PT reporting poor appetite and diarrhea    4. Enlarged prostate: flomax    Code Status: FULL  DVT prophylaxis: lovenox      NOTE: This report was transcribed using voice recognition software. Every effort was made to ensure accuracy; however, inadvertent computerized transcription errors may be present.   Electronically signed by KASSY Aparicio on 8/22/2021 at 10:28 AM

## 2021-08-22 NOTE — CONSULTS
Pulmonary Consultation    Admit Date: 8/22/2021  Requesting Physician: Kristin Mcgee MD    Reason for consultation:  · COVID-19 pneumonia  HPI:  · The patient is a 28-year-old white male, unvaccinated, presented to the hospital with increasing shortness of breath and cough. He notes that his coworkers wife had COVID-23 and he feels he Contracted it from that coworker. He is a lifelong non-smoker and states that overall, he has been pretty healthy. · Admitted to the [facility] area, the patient is currently on remdesivir, Decadron, supplemental oxygen and DVT prophylaxis. He notes he feels a little bit stronger today than when he first came in. PMH:    Past Medical History:   Diagnosis Date    Enlarged prostate      PSH:   No past surgical history on file. Review of Systems:   · Constitutional: As noted in the HPI. · Eyes: No visual changes or diplopia. No scleral icterus. · ENT: No headaches, hearing loss or vertigo. No nasal congestion, or sore throat. · Cardiovascular: No chest pain, dyspnea on exertion, or palpitations. · Respiratory: See above  · Gastrointestinal: No abdominal pain, nausea or emesis. No diarrhea or rectal bleeding or melena. No change in bowel habits. · Genitourinary: No dysuria, urinary frequency, or incontinence. No hematuria. · Musculoskeletal: No gait disturbance, weakness or joint complaints. · Integumentary: No rash or pruritis. No abnormal pigmentation, hair or nail changes. · Neurological: No headache, diplopia, dizziness, tremor, change in muscle strength, numbness or tingling. No change in gait, balance, coordination, mood, affect, memory, mentation, behavior. · Psychiatric: No anxiety or depression. · Endocrine: No temperature intolerance, excessive thirst, fluid intake, urinary frequency, excessive appetite, or recent weight change. · Hematologic/Lymphatic: No abnormal bruising or bleeding, blood clots or swollen lymph nodes.  No anemia, fever, chills, night sweats, or swollen glands. · Allergic/Immunologic: No seasonal or perenial allergies. No history of hives or atopic dermatitis. Social History:  · Alcohol:  No  · Tobacco:   No  · Employment:  no silica or asbestos exposure  · Family:  No family history of lung disease    Medications:   sodium chloride        sodium chloride flush  5-40 mL Intravenous 2 times per day    enoxaparin  30 mg Subcutaneous BID    Vitamin D  6,000 Units Oral Daily    Followed by   Carmen Naqvi ON 2021] Vitamin D  2,000 Units Oral Daily    dexamethasone  6 mg Intravenous Q12H    ascorbic acid  500 mg Oral BID    zinc sulfate  50 mg Oral Daily       Vitals:  Tmax:  VITALS:  /70   Pulse 80   Temp 99.2 °F (37.3 °C) (Oral)   Resp 18   Ht 5' 11\" (1.803 m)   Wt 189 lb (85.7 kg)   SpO2 92%   BMI 26.36 kg/m²   24HR INTAKE/OUTPUT:  No intake or output data in the 24 hours ending 21 1253  CURRENT PULSE OXIMETRY:  SpO2: 92 %  24HR PULSE OXIMETRY RANGE:  SpO2  Av.5 %  Min: 92 %  Max: 95 %    EXAM:  General: No distress. Alert. Eyes: PERRL. No sclera icterus. No conjunctival injection. ENT: No discharge. Pharynx clear. Neck: Trachea midline. Normal thyroid. No jvd, no hjr. Resp: No wheezing. No accessory muscle use. Bilateral rales. No rhonchi. CV: Regular rate. Regular rhythm. No murmur No rub. Abd: Non-tender. Non-distended. No masses. No organmegaly. Normal bowel sounds. Skin: Warm and dry. No nodule on exposed extremities. No rash on exposed extremities. Lymph: No cervical LAD. No supraclavicular LAD. Ext: No joint deformity. No clubbing. No cyanosis. No edema  Neuro: Awake. Follows commands. Positive pupils/gag/corneals. Normal pain response.      Lab Results:  CBC:   Recent Labs     21   WBC 8.3   HGB 15.3   HCT 43.7   MCV 78.3*          BMP:  Recent Labs     21      K 3.7   CL 97*   CO2 22   BUN 17   CREATININE 1.2 ALB:3,BILIDIR:3,BILITOT:3,ALKPHOS:3)@    PT/INR: No results for input(s): PROTIME, INR in the last 72 hours. Cultures:  Sputum: not available  Blood: not available    ABG:   No results for input(s): PH, PO2, PCO2, HCO3, BE, O2SAT, METHB, O2HB, COHB, O2CON, HHB, THB in the last 72 hours. Films:     No orders to display   . Assessment:  1. COVID-19 pneumonia in an unvaccinated individual.  There is no significant underlying lung disease. Plan:  1. Continue current care. Vigilance for bacterial superinfection, thromboembolic phenomenon and spontaneous pneumothoraces      Thanks for letting us see this patient in consultation. Total time in reviewing the previous admissions and records, reviewing the current x-rays, labs, and discussing with clinical staff including nursing and physicians, exceeded 50 minutes. Please contact us with any questions. Office [phone] or after hours through [employer], x [phone]. Please note that voice recognition technology was used (while wearing a Covid mandated mask) in the preparation of this note and make therefore it may contain inadvertent transcription errors. If the patient is a COVID 19 isolation patient, the above physical exam reflects that of the examining physician for the day. Yessy Hutchinson MD,  M.D., F.C.C.P.     [facility] [address], [address] [place] [address]

## 2021-08-23 LAB
ALBUMIN SERPL-MCNC: 3.4 G/DL (ref 3.5–5.2)
ALP BLD-CCNC: 114 U/L (ref 40–129)
ALT SERPL-CCNC: 40 U/L (ref 0–40)
ANION GAP SERPL CALCULATED.3IONS-SCNC: 15 MMOL/L (ref 7–16)
APTT: 27.2 SEC (ref 24.5–35.1)
AST SERPL-CCNC: 47 U/L (ref 0–39)
BASOPHILS ABSOLUTE: 0.01 E9/L (ref 0–0.2)
BASOPHILS RELATIVE PERCENT: 0.1 % (ref 0–2)
BILIRUB SERPL-MCNC: 0.3 MG/DL (ref 0–1.2)
BUN BLDV-MCNC: 27 MG/DL (ref 6–23)
C-REACTIVE PROTEIN: 6.4 MG/DL (ref 0–0.4)
CALCIUM SERPL-MCNC: 8.7 MG/DL (ref 8.6–10.2)
CHLORIDE BLD-SCNC: 94 MMOL/L (ref 98–107)
CO2: 19 MMOL/L (ref 22–29)
CREAT SERPL-MCNC: 1 MG/DL (ref 0.7–1.2)
D DIMER: 330 NG/ML DDU
EOSINOPHILS ABSOLUTE: 0 E9/L (ref 0.05–0.5)
EOSINOPHILS RELATIVE PERCENT: 0 % (ref 0–6)
FERRITIN: 1698 NG/ML
FIBRINOGEN: >700 MG/DL (ref 225–540)
GFR AFRICAN AMERICAN: >60
GFR NON-AFRICAN AMERICAN: >60 ML/MIN/1.73
GLUCOSE BLD-MCNC: 354 MG/DL (ref 74–99)
HCT VFR BLD CALC: 40.8 % (ref 37–54)
HEMOGLOBIN: 13.9 G/DL (ref 12.5–16.5)
IMMATURE GRANULOCYTES #: 0.06 E9/L
IMMATURE GRANULOCYTES %: 0.6 % (ref 0–5)
INR BLD: 1.2
L. PNEUMOPHILA SEROGP 1 UR AG: NORMAL
LACTATE DEHYDROGENASE: 443 U/L (ref 135–225)
LACTIC ACID: 3.1 MMOL/L (ref 0.5–2.2)
LYMPHOCYTES ABSOLUTE: 0.66 E9/L (ref 1.5–4)
LYMPHOCYTES RELATIVE PERCENT: 6.2 % (ref 20–42)
MCH RBC QN AUTO: 27.1 PG (ref 26–35)
MCHC RBC AUTO-ENTMCNC: 34.1 % (ref 32–34.5)
MCV RBC AUTO: 79.7 FL (ref 80–99.9)
MONOCYTES ABSOLUTE: 0.42 E9/L (ref 0.1–0.95)
MONOCYTES RELATIVE PERCENT: 4 % (ref 2–12)
NEUTROPHILS ABSOLUTE: 9.45 E9/L (ref 1.8–7.3)
NEUTROPHILS RELATIVE PERCENT: 89.1 % (ref 43–80)
PDW BLD-RTO: 14.3 FL (ref 11.5–15)
PLATELET # BLD: 262 E9/L (ref 130–450)
PMV BLD AUTO: 9.6 FL (ref 7–12)
POTASSIUM REFLEX MAGNESIUM: 4.4 MMOL/L (ref 3.5–5)
PROTHROMBIN TIME: 12.7 SEC (ref 9.3–12.4)
RBC # BLD: 5.12 E12/L (ref 3.8–5.8)
SODIUM BLD-SCNC: 128 MMOL/L (ref 132–146)
STREP PNEUMONIAE ANTIGEN, URINE: NORMAL
TOTAL PROTEIN: 7.2 G/DL (ref 6.4–8.3)
TROPONIN, HIGH SENSITIVITY: 8 NG/L (ref 0–11)
VITAMIN D 25-HYDROXY: 25 NG/ML (ref 30–100)
WBC # BLD: 10.6 E9/L (ref 4.5–11.5)

## 2021-08-23 PROCEDURE — 80053 COMPREHEN METABOLIC PANEL: CPT

## 2021-08-23 PROCEDURE — 87070 CULTURE OTHR SPECIMN AEROBIC: CPT

## 2021-08-23 PROCEDURE — 6370000000 HC RX 637 (ALT 250 FOR IP): Performed by: FAMILY MEDICINE

## 2021-08-23 PROCEDURE — 1200000000 HC SEMI PRIVATE

## 2021-08-23 PROCEDURE — 6360000002 HC RX W HCPCS: Performed by: FAMILY MEDICINE

## 2021-08-23 PROCEDURE — 36415 COLL VENOUS BLD VENIPUNCTURE: CPT

## 2021-08-23 PROCEDURE — 83605 ASSAY OF LACTIC ACID: CPT

## 2021-08-23 PROCEDURE — 85025 COMPLETE CBC W/AUTO DIFF WBC: CPT

## 2021-08-23 PROCEDURE — 2580000003 HC RX 258: Performed by: FAMILY MEDICINE

## 2021-08-23 PROCEDURE — 2580000003 HC RX 258: Performed by: SPECIALIST

## 2021-08-23 PROCEDURE — 85610 PROTHROMBIN TIME: CPT

## 2021-08-23 PROCEDURE — 6360000002 HC RX W HCPCS: Performed by: NURSE PRACTITIONER

## 2021-08-23 PROCEDURE — 86140 C-REACTIVE PROTEIN: CPT

## 2021-08-23 PROCEDURE — 82728 ASSAY OF FERRITIN: CPT

## 2021-08-23 PROCEDURE — 83615 LACTATE (LD) (LDH) ENZYME: CPT

## 2021-08-23 PROCEDURE — 2580000003 HC RX 258: Performed by: INTERNAL MEDICINE

## 2021-08-23 PROCEDURE — 2700000000 HC OXYGEN THERAPY PER DAY

## 2021-08-23 PROCEDURE — 82306 VITAMIN D 25 HYDROXY: CPT

## 2021-08-23 PROCEDURE — 84484 ASSAY OF TROPONIN QUANT: CPT

## 2021-08-23 PROCEDURE — 2500000003 HC RX 250 WO HCPCS: Performed by: SPECIALIST

## 2021-08-23 PROCEDURE — 6370000000 HC RX 637 (ALT 250 FOR IP): Performed by: NURSE PRACTITIONER

## 2021-08-23 PROCEDURE — 85730 THROMBOPLASTIN TIME PARTIAL: CPT

## 2021-08-23 PROCEDURE — 99233 SBSQ HOSP IP/OBS HIGH 50: CPT | Performed by: INTERNAL MEDICINE

## 2021-08-23 PROCEDURE — 85384 FIBRINOGEN ACTIVITY: CPT

## 2021-08-23 PROCEDURE — 85378 FIBRIN DEGRADE SEMIQUANT: CPT

## 2021-08-23 PROCEDURE — 87206 SMEAR FLUORESCENT/ACID STAI: CPT

## 2021-08-23 RX ORDER — SODIUM CHLORIDE 9 MG/ML
INJECTION, SOLUTION INTRAVENOUS CONTINUOUS
Status: DISCONTINUED | OUTPATIENT
Start: 2021-08-23 | End: 2021-08-25

## 2021-08-23 RX ADMIN — Medication 10 ML: at 21:33

## 2021-08-23 RX ADMIN — SODIUM CHLORIDE: 9 INJECTION, SOLUTION INTRAVENOUS at 16:37

## 2021-08-23 RX ADMIN — ALBUTEROL SULFATE 2 PUFF: 90 AEROSOL, METERED RESPIRATORY (INHALATION) at 09:41

## 2021-08-23 RX ADMIN — ENOXAPARIN SODIUM 30 MG: 30 INJECTION, SOLUTION INTRAVENOUS; SUBCUTANEOUS at 09:40

## 2021-08-23 RX ADMIN — Medication 500 MG: at 09:40

## 2021-08-23 RX ADMIN — REMDESIVIR 200 MG: 100 INJECTION, POWDER, LYOPHILIZED, FOR SOLUTION INTRAVENOUS at 01:31

## 2021-08-23 RX ADMIN — SODIUM CHLORIDE: 9 INJECTION, SOLUTION INTRAVENOUS at 16:35

## 2021-08-23 RX ADMIN — ENOXAPARIN SODIUM 30 MG: 30 INJECTION, SOLUTION INTRAVENOUS; SUBCUTANEOUS at 21:32

## 2021-08-23 RX ADMIN — Medication 6000 UNITS: at 09:40

## 2021-08-23 RX ADMIN — ZINC SULFATE 220 MG (50 MG) CAPSULE 50 MG: CAPSULE at 09:39

## 2021-08-23 RX ADMIN — Medication 10 ML: at 09:40

## 2021-08-23 RX ADMIN — GUAIFENESIN AND DEXTROMETHORPHAN 5 ML: 100; 10 SYRUP ORAL at 23:13

## 2021-08-23 RX ADMIN — DEXAMETHASONE SODIUM PHOSPHATE 6 MG: 4 INJECTION, SOLUTION INTRA-ARTICULAR; INTRALESIONAL; INTRAMUSCULAR; INTRAVENOUS; SOFT TISSUE at 06:34

## 2021-08-23 RX ADMIN — REMDESIVIR 100 MG: 100 INJECTION, POWDER, LYOPHILIZED, FOR SOLUTION INTRAVENOUS at 21:33

## 2021-08-23 RX ADMIN — Medication 500 MG: at 21:32

## 2021-08-23 RX ADMIN — DEXAMETHASONE SODIUM PHOSPHATE 6 MG: 4 INJECTION, SOLUTION INTRA-ARTICULAR; INTRALESIONAL; INTRAMUSCULAR; INTRAVENOUS; SOFT TISSUE at 16:40

## 2021-08-23 RX ADMIN — SODIUM CHLORIDE, PRESERVATIVE FREE 10 ML: 5 INJECTION INTRAVENOUS at 16:37

## 2021-08-23 ASSESSMENT — PAIN SCALES - GENERAL
PAINLEVEL_OUTOF10: 0

## 2021-08-23 NOTE — PROGRESS NOTES
[address] [place] [address] [facility]  Progress Note    Chief complaint: Shortness of breath    SUBJECTIVE:  Patient is tolerating medications. No reported adverse drug reactions. No nausea, vomiting, diarrhea. Tolerating medications, no rash  Currently on 6 L satting at 94%  COOK    Review of systems:  As stated above in the chief complaint, otherwise negative. Medications:  Scheduled Meds:   sodium chloride flush  5-40 mL Intravenous 2 times per day    enoxaparin  30 mg Subcutaneous BID    Vitamin D  6,000 Units Oral Daily    Followed by   Dannial Redhead ON 2021] Vitamin D  2,000 Units Oral Daily    dexamethasone  6 mg Intravenous Q12H    ascorbic acid  500 mg Oral BID    zinc sulfate  50 mg Oral Daily    remdesivir IVPB  100 mg Intravenous Q24H     Continuous Infusions:   sodium chloride 100 mL/hr at 21 1637    sodium chloride       PRN Meds:sodium chloride flush, sodium chloride, ondansetron **OR** ondansetron, polyethylene glycol, acetaminophen **OR** acetaminophen, guaiFENesin-dextromethorphan, aluminum & magnesium hydroxide-simethicone, albuterol sulfate HFA, sodium chloride    OBJECTIVE:  /66   Pulse 88   Temp 97.9 °F (36.6 °C) (Oral)   Resp 22   Ht 5' 11\" (1.803 m)   Wt 188 lb 8 oz (85.5 kg)   SpO2 94%   BMI 26.29 kg/m²   Temp  Av °F (36.7 °C)  Min: 97.9 °F (36.6 °C)  Max: 98.3 °F (36.8 °C)  Constitutional: The patient is awake, alert, and oriented. Up in a chair  Skin: Warm and dry. No rashes were noted. HEENT: Round and reactive pupils. Moist mucous membranes. No ulcerations or thrush. Neck: Supple to movements. Chest: No use of accessory muscles to breathe. Symmetrical expansion. No wheezing, crackles or rhonchi. Poor air exchange  Cardiovascular: S1 and S2 are rhythmic and regular. No murmurs appreciated. Abdomen: Positive bowel sounds to auscultation. Benign to palpation. No masses felt. No hepatosplenomegaly.   Genitourinary: Male  Extremities: No clubbing, no cyanosis, no edema.   Lines: peripheral    Laboratory and Tests Review:  Lab Results   Component Value Date    WBC 10.6 08/23/2021    WBC 8.3 08/22/2021    WBC 12.7 (H) 07/06/2016    HGB 13.9 08/23/2021    HCT 40.8 08/23/2021    MCV 79.7 (L) 08/23/2021     08/23/2021     Lab Results   Component Value Date    NEUTROABS 9.45 (H) 08/23/2021    NEUTROABS 6.72 08/22/2021    NEUTROABS 11.59 (H) 07/06/2016     No results found for: CRPHS  Lab Results   Component Value Date    ALT 40 08/23/2021    AST 47 (H) 08/23/2021    ALKPHOS 114 08/23/2021    BILITOT 0.3 08/23/2021     Lab Results   Component Value Date     08/23/2021    K 4.4 08/23/2021    CL 94 08/23/2021    CO2 19 08/23/2021    BUN 27 08/23/2021    CREATININE 1.0 08/23/2021    CREATININE 1.2 08/22/2021    CREATININE 1.5 07/06/2016    GFRAA >60 08/23/2021    LABGLOM >60 08/23/2021    GLUCOSE 354 08/23/2021    PROT 7.2 08/23/2021    LABALBU 3.4 08/23/2021    CALCIUM 8.7 08/23/2021    BILITOT 0.3 08/23/2021    ALKPHOS 114 08/23/2021    AST 47 08/23/2021    ALT 40 08/23/2021     Lab Results   Component Value Date    CRP 6.4 (H) 08/23/2021    CRP 11.6 (H) 08/22/2021     No results found for: [address]  Radiology:      Microbiology:   No results found for: BC, ORG  No results found for: BLOODCULT2, ORG  No results found for: WNDABS  No results found for: RESPSMEAR  No results found for: MPNEUMO, CLAMYDCU, LABLEGI, AFBCX, FUNGSM, LABFUNG  No results found for: CULTRESP  No results found for: CXCATHTIP  No results found for: BFCS  No results found for: CXSURG  No results found for: LABURIN  No results found for: [facility]    ASSESSMENT:  · Severe Covid pneumonia with cytokine storm    PLAN:  · Continue dexamethasone  · Remdesivir  · Had Tosi x1  · Check final cultures  · Monitor labs    Maxx Lema MD  6:14 PM  8/23/2021

## 2021-08-23 NOTE — PROGRESS NOTES
Pulmonary Progress Note    Admit Date: 2021  Hospital day                               PCP: Leida Reyna MD    Chief Complaint (s):  Patient Active Problem List   Diagnosis    Pneumonia due to COVID-19 virus    Acute respiratory failure with hypoxia ([facility] [address]       Subjective:  · Up in a chair, on the phone and questions me whether or not he is \"septic. \"  He is not. Vitals:  VITALS:  /66   Pulse 88   Temp 97.9 °F (36.6 °C) (Oral)   Resp 22   Ht 5' 11\" (1.803 m)   Wt 188 lb 8 oz (85.5 kg)   SpO2 94%   BMI 26.29 kg/m²     24HR INTAKE/OUTPUT:    No intake or output data in the 24 hours ending 21 1934    24HR PULSE OXIMETRY RANGE:    SpO2  Av.5 %  Min: 93 %  Max: 94 %    Medications:  IV:   sodium chloride 100 mL/hr at 21 1637    sodium chloride         Scheduled Meds:   sodium chloride flush  5-40 mL Intravenous 2 times per day    enoxaparin  30 mg Subcutaneous BID    Vitamin D  6,000 Units Oral Daily    Followed by   Hanh Meneses ON 2021] Vitamin D  2,000 Units Oral Daily    dexamethasone  6 mg Intravenous Q12H    ascorbic acid  500 mg Oral BID    zinc sulfate  50 mg Oral Daily    remdesivir IVPB  100 mg Intravenous Q24H       Diet:   ADULT DIET; Regular     EXAM:  General: No distress. Alert. Eyes: PERRL. No sclera icterus. No conjunctival injection. ENT: No discharge. Pharynx clear. Neck: Trachea midline. Normal thyroid. Resp: No accessory muscle use. bilateral rales. no wheezing. no rhonchi. CV: Regular rate. Regular rhythm. No murmur or rub. Abd: Non-tender. Non-distended. No masses. No organomegaly. Normal bowel sounds. Skin: Warm and dry. No nodule on exposed extremities. No rash on exposed extremities. Ext: No cyanosis, clubbing, edema  Lymph: No cervical LAD. No supraclavicular LAD. M/S: No cyanosis. No joint deformity. No clubbing. Neuro: Awake. Follows commands. Positive pupils/gag/corneals. Normal pain response. Results:  CBC:   Recent Labs     08/22/21  0319 08/23/21  1316   WBC 8.3 10.6   HGB 15.3 13.9   HCT 43.7 40.8   MCV 78.3* 79.7*    262     BMP:   Recent Labs     08/22/21  0319 08/23/21  1316    128*   K 3.7 4.4   CL 97* 94*   CO2 22 19*   BUN 17 27*   CREATININE 1.2 1.0     LIVER PROFILE:   Recent Labs     08/23/21  1316   AST 47*   ALT 40   BILITOT 0.3   ALKPHOS 114     PT/INR:   Recent Labs     08/23/21  1316   PROTIME 12.7*   INR 1.2     APTT:   Recent Labs     08/23/21  1316   APTT 27.2         Pathology:  1. N/A      Microbiology:  1. None    Recent ABG:   No results for input(s): PH, PO2, PCO2, HCO3, BE, O2SAT, METHB, O2HB, COHB, O2CON, HHB, THB in the last 72 hours. Recent Films:  No orders to display                Assessment:  1. COVID-19 pneumonia in an unvaccinated individual.  There is no significant underlying lung disease. he has received Decadron, Remdesivir and Toci     Plan:  1. Continue current care. Vigilance for bacterial superinfection, thromboembolic phenomenon and spontaneous pneumothoraces       Time at the bedside, reviewing labs and radiographs, reviewing updated notes and consultations, discussing with staff and family was more than 25 minutes. Please note that voice recognition technology was used in the preparation of this note and make therefore it may contain inadvertent transcription errors. If the patient is a COVID 19 isolation patient, the above physical exam reflects that of the examining physician for the day. Carola Shabazz MD,  M.D., F.C.C.P.     [facility], [address] [place] [address], [address] [place] [address]

## 2021-08-23 NOTE — PROGRESS NOTES
[facility] Progress Note    Admitting Date and Time: 8/22/2021  8:24 AM  Admit Dx: Pneumonia due to COVID-19 virus [U07.1, J12.82]    Subjective:  Patient is being followed for Pneumonia due to COVID-19 virus [U07.1, J12.82]   Pt feels somewhat better today, still short of breath and having cough especially on ambulation. ROS: denies fever, chills, n/v, HA unless stated above.      sodium chloride flush  5-40 mL Intravenous 2 times per day    enoxaparin  30 mg Subcutaneous BID    Vitamin D  6,000 Units Oral Daily    Followed by   Munira Cordno ON 8/29/2021] Vitamin D  2,000 Units Oral Daily    dexamethasone  6 mg Intravenous Q12H    ascorbic acid  500 mg Oral BID    zinc sulfate  50 mg Oral Daily    remdesivir IVPB  100 mg Intravenous Q24H     sodium chloride flush, 5-40 mL, PRN  sodium chloride, 25 mL, PRN  ondansetron, 4 mg, Q8H PRN   Or  ondansetron, 4 mg, Q6H PRN  polyethylene glycol, 17 g, Daily PRN  acetaminophen, 650 mg, Q6H PRN   Or  acetaminophen, 650 mg, Q6H PRN  guaiFENesin-dextromethorphan, 5 mL, Q4H PRN  aluminum & magnesium hydroxide-simethicone, 30 mL, Q6H PRN  albuterol sulfate HFA, 2 puff, Q6H PRN  sodium chloride, 30 mL, PRN         Objective:    /66   Pulse 88   Temp 97.9 °F (36.6 °C) (Oral)   Resp 22   Ht 5' 11\" (1.803 m)   Wt 188 lb 8 oz (85.5 kg)   SpO2 94%   BMI 26.29 kg/m²     General Appearance: alert and oriented to person, place and time and in no acute distress  Skin: warm and dry  Head: normocephalic and atraumatic  Eyes: pupils equal, round, and reactive to light, extraocular eye movements intact, conjunctivae normal  Neck: neck supple and non tender without mass   Pulmonary/Chest: Diminished bilaterally- no wheezes, rales or rhonchi, normal air movement, mild respiratory distress  Cardiovascular: normal rate, normal S1 and S2 and no carotid bruits  Abdomen: soft, non-tender, non-distended, normal bowel sounds, no masses or organomegaly  Extremities: no cyanosis, no clubbing and no edema  Neurologic: no cranial nerve deficit and speech normal        Recent Labs     08/22/21  0319 08/23/21  1316    128*   K 3.7 4.4   CL 97* 94*   CO2 22 19*   BUN 17 27*   CREATININE 1.2 1.0   GLUCOSE 137* 354*   CALCIUM 8.6 8.7       Recent Labs     08/22/21  0319 08/23/21  1316   WBC 8.3 10.6   RBC 5.58 5.12   HGB 15.3 13.9   HCT 43.7 40.8   MCV 78.3* 79.7*   MCH 27.4 27.1   MCHC 35.0* 34.1   RDW 14.0 14.3    262   MPV 8.9 9.6         Assessment:    Principal Problem:    Pneumonia due to COVID-19 virus  Active Problems:    Acute respiratory failure with hypoxia (HCC)  Resolved Problems:    * No resolved hospital problems. *      Plan:  1. Acute hypoxic respiratory failure due to COVID-19 pneumonia, presented with a saturation 87% on room air, requiring 6 L oxygen nasal cannula to maintain a saturation above 90%. Treat underlying process and wean off oxygen. 2.  COVID-19 pneumonia, started on Decadron, ID started the patient on remdesivir and the patient received a dose of Tocilizumab. Monitor inflammatory markers. 3.  Lactic acidosis, mostly due to dehydration secondary to COVID-19 infection, start patient IV fluids      NOTE: This report was transcribed using voice recognition software. Every effort was made to ensure accuracy; however, inadvertent computerized transcription errors may be present.   Electronically signed by Marlon Cervantes MD on 8/23/2021 at 2:52 PM

## 2021-08-24 LAB
ALBUMIN SERPL-MCNC: 3.5 G/DL (ref 3.5–5.2)
ALP BLD-CCNC: 119 U/L (ref 40–129)
ALT SERPL-CCNC: 50 U/L (ref 0–40)
ANION GAP SERPL CALCULATED.3IONS-SCNC: 13 MMOL/L (ref 7–16)
AST SERPL-CCNC: 52 U/L (ref 0–39)
BASOPHILS ABSOLUTE: 0.02 E9/L (ref 0–0.2)
BASOPHILS RELATIVE PERCENT: 0.1 % (ref 0–2)
BILIRUB SERPL-MCNC: 0.3 MG/DL (ref 0–1.2)
BUN BLDV-MCNC: 24 MG/DL (ref 6–23)
C-REACTIVE PROTEIN: 1.6 MG/DL (ref 0–0.4)
CALCIUM SERPL-MCNC: 8.4 MG/DL (ref 8.6–10.2)
CHLORIDE BLD-SCNC: 100 MMOL/L (ref 98–107)
CO2: 20 MMOL/L (ref 22–29)
CREAT SERPL-MCNC: 0.9 MG/DL (ref 0.7–1.2)
D DIMER: 348 NG/ML DDU
EOSINOPHILS ABSOLUTE: 0 E9/L (ref 0.05–0.5)
EOSINOPHILS RELATIVE PERCENT: 0 % (ref 0–6)
GFR AFRICAN AMERICAN: >60
GFR NON-AFRICAN AMERICAN: >60 ML/MIN/1.73
GLUCOSE BLD-MCNC: 289 MG/DL (ref 74–99)
HCT VFR BLD CALC: 41.9 % (ref 37–54)
HEMOGLOBIN: 14.1 G/DL (ref 12.5–16.5)
IMMATURE GRANULOCYTES #: 0.17 E9/L
IMMATURE GRANULOCYTES %: 1.1 % (ref 0–5)
LACTIC ACID: 2.7 MMOL/L (ref 0.5–2.2)
LYMPHOCYTES ABSOLUTE: 0.76 E9/L (ref 1.5–4)
LYMPHOCYTES RELATIVE PERCENT: 5 % (ref 20–42)
MCH RBC QN AUTO: 27.1 PG (ref 26–35)
MCHC RBC AUTO-ENTMCNC: 33.7 % (ref 32–34.5)
MCV RBC AUTO: 80.6 FL (ref 80–99.9)
MONOCYTES ABSOLUTE: 0.84 E9/L (ref 0.1–0.95)
MONOCYTES RELATIVE PERCENT: 5.5 % (ref 2–12)
NEUTROPHILS ABSOLUTE: 13.53 E9/L (ref 1.8–7.3)
NEUTROPHILS RELATIVE PERCENT: 88.3 % (ref 43–80)
PDW BLD-RTO: 14.9 FL (ref 11.5–15)
PLATELET # BLD: 367 E9/L (ref 130–450)
PMV BLD AUTO: 9.3 FL (ref 7–12)
POTASSIUM SERPL-SCNC: 4.7 MMOL/L (ref 3.5–5)
PROCALCITONIN: 0.17 NG/ML (ref 0–0.08)
RBC # BLD: 5.2 E12/L (ref 3.8–5.8)
SODIUM BLD-SCNC: 133 MMOL/L (ref 132–146)
TOTAL PROTEIN: 6.9 G/DL (ref 6.4–8.3)
WBC # BLD: 15.3 E9/L (ref 4.5–11.5)

## 2021-08-24 PROCEDURE — 2500000003 HC RX 250 WO HCPCS: Performed by: SPECIALIST

## 2021-08-24 PROCEDURE — 36415 COLL VENOUS BLD VENIPUNCTURE: CPT

## 2021-08-24 PROCEDURE — 2700000000 HC OXYGEN THERAPY PER DAY

## 2021-08-24 PROCEDURE — 83605 ASSAY OF LACTIC ACID: CPT

## 2021-08-24 PROCEDURE — 6360000002 HC RX W HCPCS: Performed by: FAMILY MEDICINE

## 2021-08-24 PROCEDURE — 86140 C-REACTIVE PROTEIN: CPT

## 2021-08-24 PROCEDURE — 6370000000 HC RX 637 (ALT 250 FOR IP): Performed by: NURSE PRACTITIONER

## 2021-08-24 PROCEDURE — 99233 SBSQ HOSP IP/OBS HIGH 50: CPT | Performed by: INTERNAL MEDICINE

## 2021-08-24 PROCEDURE — 85025 COMPLETE CBC W/AUTO DIFF WBC: CPT

## 2021-08-24 PROCEDURE — 2580000003 HC RX 258: Performed by: SPECIALIST

## 2021-08-24 PROCEDURE — 2580000003 HC RX 258: Performed by: INTERNAL MEDICINE

## 2021-08-24 PROCEDURE — 2580000003 HC RX 258: Performed by: FAMILY MEDICINE

## 2021-08-24 PROCEDURE — 84145 PROCALCITONIN (PCT): CPT

## 2021-08-24 PROCEDURE — 80053 COMPREHEN METABOLIC PANEL: CPT

## 2021-08-24 PROCEDURE — 6360000002 HC RX W HCPCS: Performed by: NURSE PRACTITIONER

## 2021-08-24 PROCEDURE — 6370000000 HC RX 637 (ALT 250 FOR IP): Performed by: FAMILY MEDICINE

## 2021-08-24 PROCEDURE — 1200000000 HC SEMI PRIVATE

## 2021-08-24 PROCEDURE — 85378 FIBRIN DEGRADE SEMIQUANT: CPT

## 2021-08-24 RX ADMIN — Medication 6000 UNITS: at 08:41

## 2021-08-24 RX ADMIN — ZINC SULFATE 220 MG (50 MG) CAPSULE 50 MG: CAPSULE at 08:41

## 2021-08-24 RX ADMIN — Medication 500 MG: at 21:04

## 2021-08-24 RX ADMIN — REMDESIVIR 100 MG: 100 INJECTION, POWDER, LYOPHILIZED, FOR SOLUTION INTRAVENOUS at 21:04

## 2021-08-24 RX ADMIN — SODIUM CHLORIDE: 9 INJECTION, SOLUTION INTRAVENOUS at 05:52

## 2021-08-24 RX ADMIN — DEXAMETHASONE SODIUM PHOSPHATE 6 MG: 4 INJECTION, SOLUTION INTRA-ARTICULAR; INTRALESIONAL; INTRAMUSCULAR; INTRAVENOUS; SOFT TISSUE at 17:10

## 2021-08-24 RX ADMIN — Medication 500 MG: at 08:41

## 2021-08-24 RX ADMIN — SODIUM CHLORIDE: 9 INJECTION, SOLUTION INTRAVENOUS at 17:11

## 2021-08-24 RX ADMIN — ENOXAPARIN SODIUM 30 MG: 30 INJECTION, SOLUTION INTRAVENOUS; SUBCUTANEOUS at 21:04

## 2021-08-24 RX ADMIN — Medication 10 ML: at 21:04

## 2021-08-24 RX ADMIN — ENOXAPARIN SODIUM 30 MG: 30 INJECTION, SOLUTION INTRAVENOUS; SUBCUTANEOUS at 08:41

## 2021-08-24 RX ADMIN — DEXAMETHASONE SODIUM PHOSPHATE 6 MG: 4 INJECTION, SOLUTION INTRA-ARTICULAR; INTRALESIONAL; INTRAMUSCULAR; INTRAVENOUS; SOFT TISSUE at 06:32

## 2021-08-24 ASSESSMENT — PAIN SCALES - GENERAL
PAINLEVEL_OUTOF10: 0
PAINLEVEL_OUTOF10: 0

## 2021-08-24 NOTE — PROGRESS NOTES
[facility] Progress Note    Admitting Date and Time: 8/22/2021  8:24 AM  Admit Dx: Pneumonia due to COVID-19 virus [U07.1, J12.82]    Subjective:  Patient is being followed for Pneumonia due to COVID-19 virus [U07.1, J12.82]   Pt feels somewhat better today  Had about 20 minutes phone call with the wife in the room with the patient, discussing the case and giving her updates. Oxygenation worsened overnight and he is currently on 8 L, I explained why the patient received Tocilizumab for cytokine storm and the treatment plan with Decadron and remdesivir. I also discussed with her the elevated procalcitonin and that I will repeat the test and discuss with ID further testing or the need for antibiotics to treat a superimposed infection. ROS: denies fever, chills, n/v, HA unless stated above.      sodium chloride flush  5-40 mL Intravenous 2 times per day    enoxaparin  30 mg Subcutaneous BID    Vitamin D  6,000 Units Oral Daily    Followed by   Isabel Dhillon ON 8/29/2021] Vitamin D  2,000 Units Oral Daily    dexamethasone  6 mg Intravenous Q12H    ascorbic acid  500 mg Oral BID    zinc sulfate  50 mg Oral Daily    remdesivir IVPB  100 mg Intravenous Q24H     sodium chloride flush, 5-40 mL, PRN  sodium chloride, 25 mL, PRN  ondansetron, 4 mg, Q8H PRN   Or  ondansetron, 4 mg, Q6H PRN  polyethylene glycol, 17 g, Daily PRN  acetaminophen, 650 mg, Q6H PRN   Or  acetaminophen, 650 mg, Q6H PRN  guaiFENesin-dextromethorphan, 5 mL, Q4H PRN  aluminum & magnesium hydroxide-simethicone, 30 mL, Q6H PRN  albuterol sulfate HFA, 2 puff, Q6H PRN  sodium chloride, 30 mL, PRN         Objective:    /76   Pulse 76   Temp 96 °F (35.6 °C) (Oral)   Resp 20   Ht 5' 11\" (1.803 m)   Wt 188 lb 8 oz (85.5 kg)   SpO2 92%   BMI 26.29 kg/m²     General Appearance: alert and oriented to person, place and time and in no acute distress  Skin: warm and dry  Head: normocephalic and atraumatic  Eyes: pupils equal,

## 2021-08-24 NOTE — PROGRESS NOTES
Updated wife re: oxygen increase and that RN would call with an update.   Chapin Cassidy RN NM  6:58 AM

## 2021-08-24 NOTE — PROGRESS NOTES
Pulmonary Progress Note    Admit Date: 2021  Hospital day                               PCP: Travis Rodriguez MD    Chief Complaint (s):  Patient Active Problem List   Diagnosis    Pneumonia due to COVID-19 virus    Acute respiratory failure with hypoxia ([address]       Subjective:  · Walking into the bathroom as I walked in. Appears comfortable. Vitals:  VITALS:  BP (!) 142/66   Pulse 65   Temp 97.2 °F (36.2 °C) (Oral)   Resp 22   Ht 5' 11\" (1.803 m)   Wt 188 lb 8 oz (85.5 kg)   SpO2 92%   BMI 26.29 kg/m²     24HR INTAKE/OUTPUT:      Intake/Output Summary (Last 24 hours) at 2021 192  Last data filed at 2021 1432  Gross per 24 hour   Intake 220 ml   Output 480 ml   Net -260 ml       24HR PULSE OXIMETRY RANGE:    SpO2  Av %  Min: 92 %  Max: 94 %    Medications:  IV:   sodium chloride 100 mL/hr at 21 1711    sodium chloride         Scheduled Meds:   sodium chloride flush  5-40 mL IntraVENous 2 times per day    enoxaparin  30 mg Subcutaneous BID    Vitamin D  6,000 Units Oral Daily    Followed by   Jossy Myers ON 2021] Vitamin D  2,000 Units Oral Daily    dexamethasone  6 mg IntraVENous Q12H    ascorbic acid  500 mg Oral BID    zinc sulfate  50 mg Oral Daily    remdesivir IVPB  100 mg IntraVENous Q24H       Diet:   ADULT DIET; Regular     EXAM:  General: No distress. Alert. Eyes: PERRL. No sclera icterus. No conjunctival injection. ENT: No discharge. Pharynx clear. Neck: Trachea midline. Normal thyroid. Resp: No accessory muscle use. bilateral rales. no wheezing. no rhonchi. CV: Regular rate. Regular rhythm. No murmur or rub. Abd: Non-tender. Non-distended. No masses. No organomegaly. Normal bowel sounds. Skin: Warm and dry. No nodule on exposed extremities. No rash on exposed extremities. Ext: No cyanosis, clubbing, edema  Lymph: No cervical LAD. No supraclavicular LAD. M/S: No cyanosis. No joint deformity. No clubbing. Neuro: Awake.

## 2021-08-24 NOTE — PROGRESS NOTES
[address] [place] [address] [facility]  Progress Note    Chief complaint: Shortness of breath    SUBJECTIVE:  Patient is tolerating medications. No reported adverse drug reactions. No nausea, vomiting, diarrhea. Tolerating medications, no rash  Currently on 9 L satting at 92%  COOK  Procalcitonin has gone from 0.50-0.17  C-reactive protein is down to 6.4 from 11.6  Review of systems:  As stated above in the chief complaint, otherwise negative. Medications:  Scheduled Meds:   sodium chloride flush  5-40 mL Intravenous 2 times per day    enoxaparin  30 mg Subcutaneous BID    Vitamin D  6,000 Units Oral Daily    Followed by   Sheryn Form ON 2021] Vitamin D  2,000 Units Oral Daily    dexamethasone  6 mg Intravenous Q12H    ascorbic acid  500 mg Oral BID    zinc sulfate  50 mg Oral Daily    remdesivir IVPB  100 mg Intravenous Q24H     Continuous Infusions:   sodium chloride 100 mL/hr at 21 0552    sodium chloride       PRN Meds:sodium chloride flush, sodium chloride, ondansetron **OR** ondansetron, polyethylene glycol, acetaminophen **OR** acetaminophen, guaiFENesin-dextromethorphan, aluminum & magnesium hydroxide-simethicone, albuterol sulfate HFA, sodium chloride    OBJECTIVE:  /76   Pulse 76   Temp 96 °F (35.6 °C) (Oral)   Resp 20   Ht 5' 11\" (1.803 m)   Wt 188 lb 8 oz (85.5 kg)   SpO2 92%   BMI 26.29 kg/m²   Temp  Av °F (36.1 °C)  Min: 96 °F (35.6 °C)  Max: 97.9 °F (36.6 °C)  Constitutional: The patient is awake, alert, and oriented. Up in a chair  Skin: Warm and dry. No rashes were noted. HEENT: Round and reactive pupils. Moist mucous membranes. No ulcerations or thrush. Neck: Supple to movements. Chest: No use of accessory muscles to breathe. Symmetrical expansion. No wheezing, crackles or rhonchi. Poor air exchange  Cardiovascular: S1 and S2 are rhythmic and regular. No murmurs appreciated. Abdomen: Positive bowel sounds to auscultation.  Benign to palpation. No masses felt. No hepatosplenomegaly. Genitourinary: Male  Extremities: No clubbing, no cyanosis, no edema.   Lines: peripheral    Laboratory and Tests Review:  Lab Results   Component Value Date    WBC 10.6 08/23/2021    WBC 8.3 08/22/2021    WBC 12.7 (H) 07/06/2016    HGB 13.9 08/23/2021    HCT 40.8 08/23/2021    MCV 79.7 (L) 08/23/2021     08/23/2021     Lab Results   Component Value Date    NEUTROABS 9.45 (H) 08/23/2021    NEUTROABS 6.72 08/22/2021    NEUTROABS 11.59 (H) 07/06/2016     No results found for: [facility]  Lab Results   Component Value Date    ALT 40 08/23/2021    AST 47 (H) 08/23/2021    ALKPHOS 114 08/23/2021    BILITOT 0.3 08/23/2021     Lab Results   Component Value Date     08/23/2021    K 4.4 08/23/2021    CL 94 08/23/2021    CO2 19 08/23/2021    BUN 27 08/23/2021    CREATININE 1.0 08/23/2021    CREATININE 1.2 08/22/2021    CREATININE 1.5 07/06/2016    GFRAA >60 08/23/2021    LABGLOM >60 08/23/2021    GLUCOSE 354 08/23/2021    PROT 7.2 08/23/2021    LABALBU 3.4 08/23/2021    CALCIUM 8.7 08/23/2021    BILITOT 0.3 08/23/2021    ALKPHOS 114 08/23/2021    AST 47 08/23/2021    ALT 40 08/23/2021     Lab Results   Component Value Date    CRP 6.4 (H) 08/23/2021    CRP 11.6 (H) 08/22/2021     No results found for: [address]  Radiology:  Reviewed the CT scan again today    Microbiology:   No results found for: [facility], ORG  No results found for: BLOODCULT2, ORG  No results found for: WNDABS  Smear, Respiratory   Date Value Ref Range Status   08/23/2021   Final    Group 5: >25 PMN's/LPF and <10 Epithelial cells/LPF  Moderate Polymorphonuclear leukocytes  Few Epithelial cells  Moderate Gram positive cocci in pairs  Moderate Gram positive cocci in chains  Few Gram positive cocci in clusters  Rare Gram negative rods       No results found for: MPNEUMO, CLAMYDCU, LABLEGI, AFBCX, FUNGSM, LABFUNG  No results found for: CULTRESP  No results found for: CXCATHTIP  No results found for: BFCS  No results found for: CXSURG  No results found for: LABURIN  No results found for: [facility]    ASSESSMENT:  · Severe Covid pneumonia with cytokine storm  · C-reactive protein is trending down  · Pro calcitonin on 2 readings has been in the normal range  · Not convinced patient has bacterial superinfection    PLAN:  · Continue dexamethasone  · Remdesivir day 2  · Had Tosi x1  · Check final cultures  · Monitor labs    Argentina Dunlap, APRN - CNP  11:53 AM   8/24/2021     Pt seen and examined. Above discussed agree with advanced practice nurse. Labs, cultures, and radiographs reviewed. Face to Face encounter occurred. Changes made as necessary.      Lu Bennett MD

## 2021-08-25 ENCOUNTER — APPOINTMENT (OUTPATIENT)
Dept: GENERAL RADIOLOGY | Age: 66
DRG: 177 | End: 2021-08-25
Attending: INTERNAL MEDICINE
Payer: COMMERCIAL

## 2021-08-25 LAB
ALBUMIN SERPL-MCNC: 3.3 G/DL (ref 3.5–5.2)
ALP BLD-CCNC: 125 U/L (ref 40–129)
ALT SERPL-CCNC: 70 U/L (ref 0–40)
ANION GAP SERPL CALCULATED.3IONS-SCNC: 9 MMOL/L (ref 7–16)
AST SERPL-CCNC: 53 U/L (ref 0–39)
B.E.: -3.6 MMOL/L (ref -3–3)
BASOPHILS ABSOLUTE: 0.02 E9/L (ref 0–0.2)
BASOPHILS RELATIVE PERCENT: 0.1 % (ref 0–2)
BILIRUB SERPL-MCNC: 0.4 MG/DL (ref 0–1.2)
BUN BLDV-MCNC: 23 MG/DL (ref 6–23)
CALCIUM SERPL-MCNC: 8.4 MG/DL (ref 8.6–10.2)
CHLORIDE BLD-SCNC: 100 MMOL/L (ref 98–107)
CO2: 21 MMOL/L (ref 22–29)
COHB: 0.6 % (ref 0–1.5)
CREAT SERPL-MCNC: 0.9 MG/DL (ref 0.7–1.2)
CRITICAL: ABNORMAL
CULTURE, RESPIRATORY: NORMAL
D DIMER: 603 NG/ML DDU
DATE ANALYZED: ABNORMAL
DATE OF COLLECTION: ABNORMAL
EOSINOPHILS ABSOLUTE: 0 E9/L (ref 0.05–0.5)
EOSINOPHILS RELATIVE PERCENT: 0 % (ref 0–6)
GFR AFRICAN AMERICAN: >60
GFR NON-AFRICAN AMERICAN: >60 ML/MIN/1.73
GLUCOSE BLD-MCNC: 302 MG/DL (ref 74–99)
HBA1C MFR BLD: 6.7 % (ref 4–5.6)
HCO3: 19.7 MMOL/L (ref 22–26)
HCT VFR BLD CALC: 40.2 % (ref 37–54)
HEMOGLOBIN: 13.4 G/DL (ref 12.5–16.5)
HHB: 9 % (ref 0–5)
IMMATURE GRANULOCYTES #: 0.11 E9/L
IMMATURE GRANULOCYTES %: 0.8 % (ref 0–5)
LAB: ABNORMAL
LACTIC ACID: 2.2 MMOL/L (ref 0.5–2.2)
LYMPHOCYTES ABSOLUTE: 0.7 E9/L (ref 1.5–4)
LYMPHOCYTES RELATIVE PERCENT: 5.1 % (ref 20–42)
Lab: ABNORMAL
MCH RBC QN AUTO: 26.8 PG (ref 26–35)
MCHC RBC AUTO-ENTMCNC: 33.3 % (ref 32–34.5)
MCV RBC AUTO: 80.4 FL (ref 80–99.9)
METER GLUCOSE: 230 MG/DL (ref 74–99)
METER GLUCOSE: 237 MG/DL (ref 74–99)
METER GLUCOSE: 271 MG/DL (ref 74–99)
METHB: 0.3 % (ref 0–1.5)
MODE: ABNORMAL
MONOCYTES ABSOLUTE: 0.83 E9/L (ref 0.1–0.95)
MONOCYTES RELATIVE PERCENT: 6 % (ref 2–12)
NEUTROPHILS ABSOLUTE: 12.2 E9/L (ref 1.8–7.3)
NEUTROPHILS RELATIVE PERCENT: 88 % (ref 43–80)
O2 CONTENT: 20.1 ML/DL
O2 SATURATION: 90.9 % (ref 92–98.5)
O2HB: 90.1 % (ref 94–97)
OPERATOR ID: 2485
OSMOLALITY: 294 MOSM/KG (ref 285–310)
PATIENT TEMP: 37 C
PCO2: 31.6 MMHG (ref 35–45)
PDW BLD-RTO: 14.7 FL (ref 11.5–15)
PH BLOOD GAS: 7.41 (ref 7.35–7.45)
PLATELET # BLD: 353 E9/L (ref 130–450)
PMV BLD AUTO: 9.4 FL (ref 7–12)
PO2: 60.7 MMHG (ref 75–100)
POTASSIUM SERPL-SCNC: 4.7 MMOL/L (ref 3.5–5)
PROCALCITONIN: 0.1 NG/ML (ref 0–0.08)
RBC # BLD: 5 E12/L (ref 3.8–5.8)
SMEAR, RESPIRATORY: NORMAL
SODIUM BLD-SCNC: 130 MMOL/L (ref 132–146)
SOURCE, BLOOD GAS: ABNORMAL
THB: 15.9 G/DL (ref 11.5–16.5)
TIME ANALYZED: 206
TOTAL PROTEIN: 6.3 G/DL (ref 6.4–8.3)
WBC # BLD: 13.9 E9/L (ref 4.5–11.5)

## 2021-08-25 PROCEDURE — 84145 PROCALCITONIN (PCT): CPT

## 2021-08-25 PROCEDURE — 2580000003 HC RX 258: Performed by: SPECIALIST

## 2021-08-25 PROCEDURE — 6360000002 HC RX W HCPCS: Performed by: FAMILY MEDICINE

## 2021-08-25 PROCEDURE — 36415 COLL VENOUS BLD VENIPUNCTURE: CPT

## 2021-08-25 PROCEDURE — 83930 ASSAY OF BLOOD OSMOLALITY: CPT

## 2021-08-25 PROCEDURE — 85025 COMPLETE CBC W/AUTO DIFF WBC: CPT

## 2021-08-25 PROCEDURE — 80053 COMPREHEN METABOLIC PANEL: CPT

## 2021-08-25 PROCEDURE — 82962 GLUCOSE BLOOD TEST: CPT

## 2021-08-25 PROCEDURE — 1200000000 HC SEMI PRIVATE

## 2021-08-25 PROCEDURE — 71045 X-RAY EXAM CHEST 1 VIEW: CPT

## 2021-08-25 PROCEDURE — 6360000002 HC RX W HCPCS: Performed by: NURSE PRACTITIONER

## 2021-08-25 PROCEDURE — 99233 SBSQ HOSP IP/OBS HIGH 50: CPT | Performed by: INTERNAL MEDICINE

## 2021-08-25 PROCEDURE — 2580000003 HC RX 258: Performed by: INTERNAL MEDICINE

## 2021-08-25 PROCEDURE — 6370000000 HC RX 637 (ALT 250 FOR IP): Performed by: INTERNAL MEDICINE

## 2021-08-25 PROCEDURE — 82805 BLOOD GASES W/O2 SATURATION: CPT

## 2021-08-25 PROCEDURE — 83036 HEMOGLOBIN GLYCOSYLATED A1C: CPT

## 2021-08-25 PROCEDURE — 83605 ASSAY OF LACTIC ACID: CPT

## 2021-08-25 PROCEDURE — 2700000000 HC OXYGEN THERAPY PER DAY

## 2021-08-25 PROCEDURE — 2500000003 HC RX 250 WO HCPCS: Performed by: SPECIALIST

## 2021-08-25 PROCEDURE — 2580000003 HC RX 258: Performed by: FAMILY MEDICINE

## 2021-08-25 PROCEDURE — 6370000000 HC RX 637 (ALT 250 FOR IP): Performed by: NURSE PRACTITIONER

## 2021-08-25 PROCEDURE — 6370000000 HC RX 637 (ALT 250 FOR IP): Performed by: FAMILY MEDICINE

## 2021-08-25 PROCEDURE — 85378 FIBRIN DEGRADE SEMIQUANT: CPT

## 2021-08-25 RX ORDER — QUETIAPINE FUMARATE 25 MG/1
25 TABLET, FILM COATED ORAL ONCE
Status: COMPLETED | OUTPATIENT
Start: 2021-08-25 | End: 2021-08-25

## 2021-08-25 RX ORDER — NICOTINE POLACRILEX 4 MG
15 LOZENGE BUCCAL PRN
Status: DISCONTINUED | OUTPATIENT
Start: 2021-08-25 | End: 2021-08-28 | Stop reason: HOSPADM

## 2021-08-25 RX ORDER — DEXTROSE MONOHYDRATE 25 G/50ML
12.5 INJECTION, SOLUTION INTRAVENOUS PRN
Status: DISCONTINUED | OUTPATIENT
Start: 2021-08-25 | End: 2021-08-28 | Stop reason: HOSPADM

## 2021-08-25 RX ORDER — LANOLIN ALCOHOL/MO/W.PET/CERES
3 CREAM (GRAM) TOPICAL NIGHTLY PRN
Status: DISCONTINUED | OUTPATIENT
Start: 2021-08-25 | End: 2021-08-28 | Stop reason: HOSPADM

## 2021-08-25 RX ORDER — DEXTROSE MONOHYDRATE 50 MG/ML
100 INJECTION, SOLUTION INTRAVENOUS PRN
Status: DISCONTINUED | OUTPATIENT
Start: 2021-08-25 | End: 2021-08-28 | Stop reason: HOSPADM

## 2021-08-25 RX ADMIN — REMDESIVIR 100 MG: 100 INJECTION, POWDER, LYOPHILIZED, FOR SOLUTION INTRAVENOUS at 21:51

## 2021-08-25 RX ADMIN — Medication 3 MG: at 21:59

## 2021-08-25 RX ADMIN — ENOXAPARIN SODIUM 30 MG: 30 INJECTION, SOLUTION INTRAVENOUS; SUBCUTANEOUS at 21:51

## 2021-08-25 RX ADMIN — Medication 500 MG: at 08:29

## 2021-08-25 RX ADMIN — Medication 500 MG: at 21:50

## 2021-08-25 RX ADMIN — QUETIAPINE FUMARATE 25 MG: 25 TABLET ORAL at 02:21

## 2021-08-25 RX ADMIN — INSULIN LISPRO 6 UNITS: 100 INJECTION, SOLUTION INTRAVENOUS; SUBCUTANEOUS at 16:37

## 2021-08-25 RX ADMIN — Medication 10 ML: at 21:51

## 2021-08-25 RX ADMIN — INSULIN LISPRO 2 UNITS: 100 INJECTION, SOLUTION INTRAVENOUS; SUBCUTANEOUS at 22:06

## 2021-08-25 RX ADMIN — Medication 6000 UNITS: at 08:28

## 2021-08-25 RX ADMIN — DEXAMETHASONE SODIUM PHOSPHATE 6 MG: 4 INJECTION, SOLUTION INTRA-ARTICULAR; INTRALESIONAL; INTRAMUSCULAR; INTRAVENOUS; SOFT TISSUE at 18:14

## 2021-08-25 RX ADMIN — ZINC SULFATE 220 MG (50 MG) CAPSULE 50 MG: CAPSULE at 08:29

## 2021-08-25 RX ADMIN — SODIUM CHLORIDE: 9 INJECTION, SOLUTION INTRAVENOUS at 02:21

## 2021-08-25 RX ADMIN — ENOXAPARIN SODIUM 30 MG: 30 INJECTION, SOLUTION INTRAVENOUS; SUBCUTANEOUS at 08:28

## 2021-08-25 RX ADMIN — DEXAMETHASONE SODIUM PHOSPHATE 6 MG: 4 INJECTION, SOLUTION INTRA-ARTICULAR; INTRALESIONAL; INTRAMUSCULAR; INTRAVENOUS; SOFT TISSUE at 05:14

## 2021-08-25 ASSESSMENT — PAIN SCALES - GENERAL
PAINLEVEL_OUTOF10: 0

## 2021-08-25 NOTE — PROGRESS NOTES
[facility] Progress Note    Admitting Date and Time: 8/22/2021  8:24 AM  Admit Dx: Pneumonia due to COVID-19 virus [U07.1, J12.82]    Subjective:  Patient is being followed for Pneumonia due to COVID-19 virus [U07.1, J12.82]   Pt is with worsening hypoxia, requiring 12 L NC now. Patient has not been sleeping well for the last 3 nights, he took his oxygen off to go to the bathroom when he became hypoxic and confused he started wandering the hallway, he was directed back to baseline     ROS: denies fever, chills, n/v, HA unless stated above.      sodium chloride flush  5-40 mL IntraVENous 2 times per day    enoxaparin  30 mg Subcutaneous BID    Vitamin D  6,000 Units Oral Daily    Followed by   Delbert Khan ON 8/29/2021] Vitamin D  2,000 Units Oral Daily    dexamethasone  6 mg IntraVENous Q12H    ascorbic acid  500 mg Oral BID    zinc sulfate  50 mg Oral Daily    remdesivir IVPB  100 mg IntraVENous Q24H     sodium chloride flush, 5-40 mL, PRN  sodium chloride, 25 mL, PRN  ondansetron, 4 mg, Q8H PRN   Or  ondansetron, 4 mg, Q6H PRN  polyethylene glycol, 17 g, Daily PRN  acetaminophen, 650 mg, Q6H PRN   Or  acetaminophen, 650 mg, Q6H PRN  guaiFENesin-dextromethorphan, 5 mL, Q4H PRN  aluminum & magnesium hydroxide-simethicone, 30 mL, Q6H PRN  albuterol sulfate HFA, 2 puff, Q6H PRN  sodium chloride, 30 mL, PRN         Objective:    /70   Pulse 74   Temp 97.4 °F (36.3 °C) (Oral)   Resp 20   Ht 5' 11\" (1.803 m)   Wt 193 lb 11.2 oz (87.9 kg)   SpO2 90%   BMI 27.02 kg/m²     General Appearance: alert and oriented to person, place and time and in no acute distress  Skin: warm and dry  Head: normocephalic and atraumatic  Eyes: pupils equal, round, and reactive to light, extraocular eye movements intact, conjunctivae normal  Neck: neck supple and non tender without mass   Pulmonary/Chest: Diminished bilaterally- no wheezes, rales or rhonchi, normal air movement, mild respiratory distress  Cardiovascular: normal rate, normal S1 and S2 and no carotid bruits  Abdomen: soft, non-tender, non-distended, normal bowel sounds, no masses or organomegaly  Extremities: no cyanosis, no clubbing and no edema  Neurologic: no cranial nerve deficit and speech normal        Recent Labs     08/23/21  1316 08/24/21  1500   * 133   K 4.4 4.7   CL 94* 100   CO2 19* 20*   BUN 27* 24*   CREATININE 1.0 0.9   GLUCOSE 354* 289*   CALCIUM 8.7 8.4*       Recent Labs     08/23/21  1316 08/24/21  1500   WBC 10.6 15.3*   RBC 5.12 5.20   HGB 13.9 14.1   HCT 40.8 41.9   MCV 79.7* 80.6   MCH 27.1 27.1   MCHC 34.1 33.7   RDW 14.3 14.9    367   MPV 9.6 9.3         Assessment:    Principal Problem:    Pneumonia due to COVID-19 virus  Active Problems:    Acute respiratory failure with hypoxia (HCC)  Resolved Problems:    * No resolved hospital problems. *      Plan:  1. Acute hypoxic respiratory failure due to COVID-19 pneumonia, presented with a saturation 87% on room air, requiring 12 L oxygen nasal cannula to maintain a saturation above 90%. Treat underlying process and wean off oxygen. 2.  COVID-19 pneumonia, started on Decadron, ID started the patient on remdesivir and the patient received a dose of Tocilizumab. Monitor inflammatory markers. Repeated procal is down, no need for abx. 3.  Lactic acidosis, mostly due to dehydration secondary to COVID-19 infection, treated with IV fluids and improved  4. Acute encephalopathy most likely due to hypoxia, no neurological deficit, no concern for stroke. 5.  Hyperglycemia due to steroids, patient with no history of diabetes, will obtain A1c start the patient on moderate dose sliding scale insulin. NOTE: This report was transcribed using voice recognition software. Every effort was made to ensure accuracy; however, inadvertent computerized transcription errors may be present.   Electronically signed by Danette Romeo MD on 8/25/2021 at 9:10 AM

## 2021-08-25 NOTE — PROGRESS NOTES
Pulmonary Progress Note    Admit Date: 2021  Hospital day                               PCP: Bettie Zaragoza MD    Chief Complaint (s):  Patient Active Problem List   Diagnosis    Pneumonia due to COVID-19 virus    Acute respiratory failure with hypoxia (HCC)       Subjective:  · Sitting up this p.m., on substantial amounts of oxygen that has been actually increasing over the past several days. Procalcitonin noted, defer antimicrobials to infectious disease. Vitals:  VITALS:  /70   Pulse 74   Temp 97.4 °F (36.3 °C) (Oral)   Resp 20   Ht 5' 11\" (1.803 m)   Wt 193 lb 11.2 oz (87.9 kg)   SpO2 95%   BMI 27.02 kg/m²     24HR INTAKE/OUTPUT:      Intake/Output Summary (Last 24 hours) at 2021 1338  Last data filed at 2021 0815  Gross per 24 hour   Intake 400 ml   Output 1250 ml   Net -850 ml       24HR PULSE OXIMETRY RANGE:    SpO2  Av.1 %  Min: 87 %  Max: 95 %    Medications:  IV:   dextrose      sodium chloride         Scheduled Meds:   insulin lispro  0-12 Units Subcutaneous TID WC    insulin lispro  0-6 Units Subcutaneous Nightly    sodium chloride flush  5-40 mL IntraVENous 2 times per day    enoxaparin  30 mg Subcutaneous BID    Vitamin D  6,000 Units Oral Daily    Followed by   Walter Israel ON 2021] Vitamin D  2,000 Units Oral Daily    dexamethasone  6 mg IntraVENous Q12H    ascorbic acid  500 mg Oral BID    zinc sulfate  50 mg Oral Daily    remdesivir IVPB  100 mg IntraVENous Q24H       Diet:   ADULT DIET; Regular  Adult Oral Nutrition Supplement; Low Calorie/High Protein Oral Supplement  Adult Oral Nutrition Supplement; Diabetic Oral Supplement     EXAM:  General: No distress. Alert. Eyes: PERRL. No sclera icterus. No conjunctival injection. ENT: No discharge. Pharynx clear. Neck: Trachea midline. Normal thyroid. Resp: No accessory muscle use. bilateral rales. no wheezing. no rhonchi. CV: Regular rate. Regular rhythm. No murmur or rub. Abd: Non-tender. Non-distended. No masses. No organomegaly. Normal bowel sounds. Skin: Warm and dry. No nodule on exposed extremities. No rash on exposed extremities. Ext: No cyanosis, clubbing, edema  Lymph: No cervical LAD. No supraclavicular LAD. M/S: No cyanosis. No joint deformity. No clubbing. Neuro: Awake. Follows commands. Positive pupils/gag/corneals. Normal pain response. Results:  CBC:   Recent Labs     08/23/21  1316 08/24/21  1500   WBC 10.6 15.3*   HGB 13.9 14.1   HCT 40.8 41.9   MCV 79.7* 80.6    367     BMP:   Recent Labs     08/23/21  1316 08/24/21  1500   * 133   K 4.4 4.7   CL 94* 100   CO2 19* 20*   BUN 27* 24*   CREATININE 1.0 0.9     LIVER PROFILE:   Recent Labs     08/23/21  1316 08/24/21  1500   AST 47* 52*   ALT 40 50*   BILITOT 0.3 0.3   ALKPHOS 114 119     PT/INR:   Recent Labs     08/23/21  1316   PROTIME 12.7*   INR 1.2     APTT:   Recent Labs     08/23/21  1316   APTT 27.2         Pathology:  1. N/A      Microbiology:  1. None    Recent ABG:   Recent Labs     08/25/21  0206   PH 7.413   PO2 60.7*   PCO2 31.6*   HCO3 19.7*   BE -3.6*   O2SAT 90.9*   METHB 0.3   O2HB 90.1*   COHB 0.6   O2CON 20.1   HHB 9.0*   THB 15.9             Recent Films:  No orders to display                Assessment:  1. COVID-19 pneumonia in an unvaccinated individual.  There is no significant underlying lung disease. he has received Decadron, Remdesivir and Toci     Plan:  1. Continue current care. Vigilance for bacterial superinfection, thromboembolic phenomenon and spontaneous pneumothoraces. 2. Wean FiO2 as tolerated.       Time at the bedside, reviewing labs and radiographs, reviewing updated notes and consultations, discussing with staff and family was more than 25 minutes. Please note that voice recognition technology was used in the preparation of this note and make therefore it may contain inadvertent transcription errors.   If the patient is a COVID 19 isolation patient, the above physical exam reflects that of the examining physician for the day. Jorge A Coyne MD,  MSuziD., F.C.C.P.     [facility], [address] [place] [address], [address] [place] [address], [facility]

## 2021-08-25 NOTE — PROGRESS NOTES
The patient was found on 4W with new onset confusion. Patient thought he was at the [facility] and taking a walk with a friend. Patient was reoriented and taken back to his room, Oxygen was placed back on patient. ABG's were obtained as well as a full set of vitals and Dr. Jocelin Amezcua was called and updated on new onset of confusion and orders were received.

## 2021-08-25 NOTE — PROGRESS NOTES
[address] [place] [address] [facility]  Progress Note    Chief complaint: Shortness of breath    SUBJECTIVE:  Patient is lying in bed. Oxygen requirements have gone up   currently on 12LNC; SpO2 95%  No fevers. Tolerating medications     Review of systems:  As stated above in the chief complaint, otherwise negative. Medications:  Scheduled Meds:   insulin lispro  0-12 Units Subcutaneous TID WC    insulin lispro  0-6 Units Subcutaneous Nightly    sodium chloride flush  5-40 mL IntraVENous 2 times per day    enoxaparin  30 mg Subcutaneous BID    Vitamin D  6,000 Units Oral Daily    Followed by    Pema Briones ON 2021] Vitamin D  2,000 Units Oral Daily    dexamethasone  6 mg IntraVENous Q12H    ascorbic acid  500 mg Oral BID    zinc sulfate  50 mg Oral Daily    remdesivir IVPB  100 mg IntraVENous Q24H     Continuous Infusions:   dextrose      sodium chloride       PRN Meds:glucose, dextrose, glucagon (rDNA), dextrose, melatonin, sodium chloride flush, sodium chloride, ondansetron **OR** ondansetron, polyethylene glycol, acetaminophen **OR** acetaminophen, guaiFENesin-dextromethorphan, aluminum & magnesium hydroxide-simethicone, albuterol sulfate HFA, sodium chloride    OBJECTIVE:  /70   Pulse 74   Temp 97.4 °F (36.3 °C) (Oral)   Resp 20   Ht 5' 11\" (1.803 m)   Wt 193 lb 11.2 oz (87.9 kg)   SpO2 95%   BMI 27.02 kg/m²   Temp  Av.9 °F (36.1 °C)  Min: 96 °F (35.6 °C)  Max: 97.4 °F (36.3 °C)  Constitutional: The patient is awake, alert, and oriented. Lying in bed. Skin: Warm and dry. No rashes were noted. HEENT: Round and reactive pupils. Moist mucous membranes. No ulcerations or thrush. Neck: Supple to movements. Chest: No use of accessory muscles to breathe. Symmetrical expansion. Diminshed, poor overall air exchange. 12LNC  Cardiovascular: S1 and S2 are rhythmic and regular. No murmurs appreciated. Abdomen: Positive bowel sounds to auscultation. Benign to palpation.  No masses felt. Genitourinary: Male  Extremities: No edema.   Lines: peripheral    Laboratory and Tests Review:  Lab Results   Component Value Date    WBC 15.3 (H) 08/24/2021    WBC 10.6 08/23/2021    WBC 8.3 08/22/2021    HGB 14.1 08/24/2021    HCT 41.9 08/24/2021    MCV 80.6 08/24/2021     08/24/2021     Lab Results   Component Value Date    NEUTROABS 13.53 (H) 08/24/2021    NEUTROABS 9.45 (H) 08/23/2021    NEUTROABS 6.72 08/22/2021     No results found for: [facility]  Lab Results   Component Value Date    ALT 50 (H) 08/24/2021    AST 52 (H) 08/24/2021    ALKPHOS 119 08/24/2021    BILITOT 0.3 08/24/2021     Lab Results   Component Value Date     08/24/2021    K 4.7 08/24/2021    K 4.4 08/23/2021     08/24/2021    CO2 20 08/24/2021    BUN 24 08/24/2021    CREATININE 0.9 08/24/2021    CREATININE 1.0 08/23/2021    CREATININE 1.2 08/22/2021    GFRAA >60 08/24/2021    LABGLOM >60 08/24/2021    GLUCOSE 289 08/24/2021    PROT 6.9 08/24/2021    LABALBU 3.5 08/24/2021    CALCIUM 8.4 08/24/2021    BILITOT 0.3 08/24/2021    ALKPHOS 119 08/24/2021    AST 52 08/24/2021    ALT 50 08/24/2021     Lab Results   Component Value Date    CRP 1.6 (H) 08/24/2021    CRP 6.4 (H) 08/23/2021    CRP 11.6 (H) 08/22/2021     No results found for: [facility]    Radiology:  Reviewed    Microbiology:   Streptococcus pneumoniae/Legionella urine Ag: negative  Respiratory cx: oral betsy present     SARS-COV-2 biomarkers    Recent Labs     08/23/21  1316 08/24/21  1500   CRP 6.4* 1.6*   PROCAL  --  0.17*   FERRITIN 1,698  --    *  --    DDIMER 330 348   FIBRINOGEN >700*  --    INR 1.2  --    PROTIME 12.7*  --    AST 47* 52*   ALT 40 50*     Lab Results   Component Value Date    CHOL 262 06/23/2016    TRIG 188 06/23/2016    HDL 38 06/23/2016    LDLCALC 186 06/23/2016    LABVLDL 38 06/23/2016     Lab Results   Component Value Date/Time    VITD25 25 (L) 08/23/2021 01:16 PM       ASSESSMENT:  Severe Covid pneumonia with cytokine storm  Doubt superimposed bacterial pneumonia with decreasing pro-eligio &  CRP  Leukocytosis, secondary to IV steroids    PLAN:  Continue Remdesivir day 3  Dexamethasone 6mg IV BID  Had Tosi x1  Supportive care - KHADIJAH HUNTER [facility], proning   Monitor labs  May need repeat imaging     ANTON Matute - CNP  1:50 PM   8/25/2021     Pt seen and examined. Above discussed agree with advanced practice nurse. Labs, cultures, and radiographs reviewed. Face to Face encounter occurred. Changes made as necessary.      Brian Rutherford MD

## 2021-08-25 NOTE — PROGRESS NOTES
Patient's wife Kalie Cassidy called and requested that she is notified of any change in patient status. She can be reached at cell phone [phone] or home phone [phone].

## 2021-08-25 NOTE — ACP (ADVANCE CARE PLANNING)
Advance Care Planning     Advance Care Planning Activator (Inpatient)  Conversation Note      Date of ACP Conversation: 8/25/2021     Conversation Conducted with: Patient with Decision Making Capacity    ACP Activator: Oleg Carlos, [address] [place] [address] Decision Maker:     Current Designated Health Care Decision Maker:     Primary Decision Maker: Rosario Banerjee - [phone]   Mr. Roman does not want a secondary decision maker  Click here to complete Healthcare Decision Makers including section of the Healthcare Decision Maker Relationship (ie \"Primary\")  Today we documented Decision Maker(s) consistent with Legal Next of Kin hierarchy. Care Preferences    Ventilation: \"If you were in your present state of health and suddenly became very ill and were unable to breathe on your own, what would your preference be about the use of a ventilator (breathing machine) if it were available to you? \"      Would the patient desire the use of ventilator (breathing machine)?: yes    \"If your health worsens and it becomes clear that your chance of recovery is unlikely, what would your preference be about the use of a ventilator (breathing machine) if it were available to you? \"     Would the patient desire the use of ventilator (breathing machine)?:  Yes      Resuscitation  \"CPR works best to restart the heart when there is a sudden event, like a heart attack, in someone who is otherwise healthy. Unfortunately, CPR does not typically restart the heart for people who have serious health conditions or who are very sick. \"    \"In the event your heart stopped as a result of an underlying serious health condition, would you want attempts to be made to restart your heart (answer \"yes\" for attempt to resuscitate) or would you prefer a natural death (answer \"no\" for do not attempt to resuscitate)? \" yes       [x] Yes   [] No   Educated Patient / Jocelyn Williamosn regarding differences between Advance Directives and portable DNR orders.     Length of ACP Conversation in minutes:  10 minutes    Conversation Outcomes:  [x] ACP discussion completed  [] Existing advance directive reviewed with patient; no changes to patient's previously recorded wishes  [] New Advance Directive completed  [] Portable Do Not Rescitate prepared for Provider review and signature  [] POLST/POST/MOLST/MOST prepared for Provider review and signature      Follow-up plan:    [x] Schedule follow-up conversation to continue planning  [] Referred individual to Provider for additional questions/concerns   [] Advised patient/agent/surrogate to review completed ACP document and update if needed with changes in condition, patient preferences or care setting    [] This note routed to one or more involved healthcare providers

## 2021-08-26 LAB
ALBUMIN SERPL-MCNC: 3.7 G/DL (ref 3.5–5.2)
ALP BLD-CCNC: 103 U/L (ref 40–129)
ALT SERPL-CCNC: 84 U/L (ref 0–40)
ANION GAP SERPL CALCULATED.3IONS-SCNC: 7 MMOL/L (ref 7–16)
AST SERPL-CCNC: 48 U/L (ref 0–39)
BASOPHILS ABSOLUTE: 0.02 E9/L (ref 0–0.2)
BASOPHILS RELATIVE PERCENT: 0.1 % (ref 0–2)
BILIRUB SERPL-MCNC: 0.4 MG/DL (ref 0–1.2)
BUN BLDV-MCNC: 24 MG/DL (ref 6–23)
CALCIUM SERPL-MCNC: 8.5 MG/DL (ref 8.6–10.2)
CHLORIDE BLD-SCNC: 98 MMOL/L (ref 98–107)
CO2: 26 MMOL/L (ref 22–29)
CREAT SERPL-MCNC: 1 MG/DL (ref 0.7–1.2)
EOSINOPHILS ABSOLUTE: 0 E9/L (ref 0.05–0.5)
EOSINOPHILS RELATIVE PERCENT: 0 % (ref 0–6)
GFR AFRICAN AMERICAN: >60
GFR NON-AFRICAN AMERICAN: >60 ML/MIN/1.73
GLUCOSE BLD-MCNC: 228 MG/DL (ref 74–99)
HCT VFR BLD CALC: 40.1 % (ref 37–54)
HEMOGLOBIN: 13.5 G/DL (ref 12.5–16.5)
IMMATURE GRANULOCYTES #: 0.09 E9/L
IMMATURE GRANULOCYTES %: 0.7 % (ref 0–5)
LYMPHOCYTES ABSOLUTE: 0.86 E9/L (ref 1.5–4)
LYMPHOCYTES RELATIVE PERCENT: 6.3 % (ref 20–42)
MCH RBC QN AUTO: 26.7 PG (ref 26–35)
MCHC RBC AUTO-ENTMCNC: 33.7 % (ref 32–34.5)
MCV RBC AUTO: 79.4 FL (ref 80–99.9)
METER GLUCOSE: 204 MG/DL (ref 74–99)
METER GLUCOSE: 248 MG/DL (ref 74–99)
METER GLUCOSE: 272 MG/DL (ref 74–99)
METER GLUCOSE: 286 MG/DL (ref 74–99)
MONOCYTES ABSOLUTE: 0.73 E9/L (ref 0.1–0.95)
MONOCYTES RELATIVE PERCENT: 5.4 % (ref 2–12)
NEUTROPHILS ABSOLUTE: 11.92 E9/L (ref 1.8–7.3)
NEUTROPHILS RELATIVE PERCENT: 87.5 % (ref 43–80)
PDW BLD-RTO: 14.6 FL (ref 11.5–15)
PLATELET # BLD: 303 E9/L (ref 130–450)
PMV BLD AUTO: 9.3 FL (ref 7–12)
POTASSIUM SERPL-SCNC: 5.2 MMOL/L (ref 3.5–5)
RBC # BLD: 5.05 E12/L (ref 3.8–5.8)
SODIUM BLD-SCNC: 131 MMOL/L (ref 132–146)
TOTAL PROTEIN: 5.9 G/DL (ref 6.4–8.3)
WBC # BLD: 13.6 E9/L (ref 4.5–11.5)

## 2021-08-26 PROCEDURE — 6370000000 HC RX 637 (ALT 250 FOR IP): Performed by: INTERNAL MEDICINE

## 2021-08-26 PROCEDURE — 82962 GLUCOSE BLOOD TEST: CPT

## 2021-08-26 PROCEDURE — 2700000000 HC OXYGEN THERAPY PER DAY

## 2021-08-26 PROCEDURE — 99233 SBSQ HOSP IP/OBS HIGH 50: CPT | Performed by: INTERNAL MEDICINE

## 2021-08-26 PROCEDURE — 85025 COMPLETE CBC W/AUTO DIFF WBC: CPT

## 2021-08-26 PROCEDURE — 36415 COLL VENOUS BLD VENIPUNCTURE: CPT

## 2021-08-26 PROCEDURE — 6360000002 HC RX W HCPCS: Performed by: FAMILY MEDICINE

## 2021-08-26 PROCEDURE — 2580000003 HC RX 258: Performed by: SPECIALIST

## 2021-08-26 PROCEDURE — 2580000003 HC RX 258: Performed by: FAMILY MEDICINE

## 2021-08-26 PROCEDURE — 80053 COMPREHEN METABOLIC PANEL: CPT

## 2021-08-26 PROCEDURE — 6360000002 HC RX W HCPCS: Performed by: NURSE PRACTITIONER

## 2021-08-26 PROCEDURE — 6370000000 HC RX 637 (ALT 250 FOR IP): Performed by: NURSE PRACTITIONER

## 2021-08-26 PROCEDURE — 2500000003 HC RX 250 WO HCPCS: Performed by: SPECIALIST

## 2021-08-26 PROCEDURE — 6370000000 HC RX 637 (ALT 250 FOR IP): Performed by: FAMILY MEDICINE

## 2021-08-26 PROCEDURE — 1200000000 HC SEMI PRIVATE

## 2021-08-26 RX ADMIN — INSULIN LISPRO 6 UNITS: 100 INJECTION, SOLUTION INTRAVENOUS; SUBCUTANEOUS at 16:31

## 2021-08-26 RX ADMIN — DEXAMETHASONE SODIUM PHOSPHATE 6 MG: 4 INJECTION, SOLUTION INTRA-ARTICULAR; INTRALESIONAL; INTRAMUSCULAR; INTRAVENOUS; SOFT TISSUE at 18:01

## 2021-08-26 RX ADMIN — INSULIN LISPRO 3 UNITS: 100 INJECTION, SOLUTION INTRAVENOUS; SUBCUTANEOUS at 20:05

## 2021-08-26 RX ADMIN — REMDESIVIR 100 MG: 100 INJECTION, POWDER, LYOPHILIZED, FOR SOLUTION INTRAVENOUS at 20:00

## 2021-08-26 RX ADMIN — ENOXAPARIN SODIUM 30 MG: 30 INJECTION, SOLUTION INTRAVENOUS; SUBCUTANEOUS at 20:00

## 2021-08-26 RX ADMIN — ENOXAPARIN SODIUM 30 MG: 30 INJECTION, SOLUTION INTRAVENOUS; SUBCUTANEOUS at 09:18

## 2021-08-26 RX ADMIN — Medication 10 ML: at 09:18

## 2021-08-26 RX ADMIN — Medication 6000 UNITS: at 09:17

## 2021-08-26 RX ADMIN — Medication 500 MG: at 09:17

## 2021-08-26 RX ADMIN — ZINC SULFATE 220 MG (50 MG) CAPSULE 50 MG: CAPSULE at 09:17

## 2021-08-26 RX ADMIN — INSULIN LISPRO 4 UNITS: 100 INJECTION, SOLUTION INTRAVENOUS; SUBCUTANEOUS at 11:30

## 2021-08-26 RX ADMIN — DEXAMETHASONE SODIUM PHOSPHATE 6 MG: 4 INJECTION, SOLUTION INTRA-ARTICULAR; INTRALESIONAL; INTRAMUSCULAR; INTRAVENOUS; SOFT TISSUE at 05:56

## 2021-08-26 RX ADMIN — INSULIN LISPRO 4 UNITS: 100 INJECTION, SOLUTION INTRAVENOUS; SUBCUTANEOUS at 06:19

## 2021-08-26 RX ADMIN — Medication 500 MG: at 20:00

## 2021-08-26 RX ADMIN — Medication 10 ML: at 20:00

## 2021-08-26 ASSESSMENT — PAIN SCALES - GENERAL
PAINLEVEL_OUTOF10: 0
PAINLEVEL_OUTOF10: 0

## 2021-08-26 NOTE — PROGRESS NOTES
Pulmonary Progress Note    Admit Date: 2021  Hospital day                               PCP: Mariana Nieves MD    Chief Complaint (s):  Patient Active Problem List   Diagnosis    Pneumonia due to COVID-19 virus    Acute respiratory failure with hypoxia (HCC)       Subjective:  · Sitting up. Discussed with the patient's wife. Oxygenation is improved over the past 24 hours in contrast to his worsening for the past 3 days. The patient is awake, alert and conversant. Vitals:  VITALS:  BP (!) 145/84   Pulse 62   Temp 97.1 °F (36.2 °C) (Oral)   Resp 18   Ht 5' 11\" (1.803 m)   Wt 189 lb 8 oz (86 kg)   SpO2 92%   BMI 26.43 kg/m²     24HR INTAKE/OUTPUT:      Intake/Output Summary (Last 24 hours) at 2021 1842  Last data filed at 2021 1807  Gross per 24 hour   Intake 840 ml   Output 750 ml   Net 90 ml       24HR PULSE OXIMETRY RANGE:    SpO2  Av.3 %  Min: 92 %  Max: 98 %    Medications:  IV:   dextrose      sodium chloride         Scheduled Meds:   insulin lispro  0-12 Units Subcutaneous TID WC    insulin lispro  0-6 Units Subcutaneous Nightly    sodium chloride flush  5-40 mL IntraVENous 2 times per day    enoxaparin  30 mg Subcutaneous BID    Vitamin D  6,000 Units Oral Daily    Followed by   Charlton Fossa ON 2021] Vitamin D  2,000 Units Oral Daily    dexamethasone  6 mg IntraVENous Q12H    ascorbic acid  500 mg Oral BID    zinc sulfate  50 mg Oral Daily    remdesivir IVPB  100 mg IntraVENous Q24H       Diet:   ADULT DIET; Regular  Adult Oral Nutrition Supplement; Low Calorie/High Protein Oral Supplement  Adult Oral Nutrition Supplement; Diabetic Oral Supplement     EXAM:  General: No distress. Alert. Eyes: PERRL. No sclera icterus. No conjunctival injection. ENT: No discharge. Pharynx clear. Neck: Trachea midline. Normal thyroid. Resp: No accessory muscle use. bilateral rales. no wheezing. no rhonchi. CV: Regular rate. Regular rhythm. No murmur or rub. Abd: Non-tender. Non-distended. No masses. No organomegaly. Normal bowel sounds. Skin: Warm and dry. No nodule on exposed extremities. No rash on exposed extremities. Ext: No cyanosis, clubbing, edema  Lymph: No cervical LAD. No supraclavicular LAD. M/S: No cyanosis. No joint deformity. No clubbing. Neuro: Awake. Follows commands. Positive pupils/gag/corneals. Normal pain response. Results:  CBC:   Recent Labs     08/24/21  1500 08/25/21  1350 08/26/21  0358   WBC 15.3* 13.9* 13.6*   HGB 14.1 13.4 13.5   HCT 41.9 40.2 40.1   MCV 80.6 80.4 79.4*    353 303     BMP:   Recent Labs     08/24/21  1500 08/25/21  1350 08/26/21  0358    130* 131*   K 4.7 4.7 5.2*    100 98   CO2 20* 21* 26   BUN 24* 23 24*   CREATININE 0.9 0.9 1.0     LIVER PROFILE:   Recent Labs     08/24/21  1500 08/25/21  1350 08/26/21  0358   AST 52* 53* 48*   ALT 50* 70* 84*   BILITOT 0.3 0.4 0.4   ALKPHOS 119 125 103     PT/INR:   No results for input(s): PROTIME, INR in the last 72 hours. APTT:   No results for input(s): APTT in the last 72 hours. Pathology:  1. N/A      Microbiology:  1. None    Recent ABG:   Recent Labs     08/25/21  0206   PH 7.413   PO2 60.7*   PCO2 31.6*   HCO3 19.7*   BE -3.6*   O2SAT 90.9*   METHB 0.3   O2HB 90.1*   COHB 0.6   O2CON 20.1   HHB 9.0*   THB 15.9             Recent Films:  XR CHEST PORTABLE   Final Result   Stable bilateral pulmonary infiltrates consistent with patient's history of   COVID-19 pneumonia.                      Assessment:  1. COVID-19 pneumonia in an unvaccinated individual.  There is no significant underlying lung disease. he has received Decadron, Remdesivir and Toci     Plan:  1. Continue current care. Vigilance for bacterial superinfection, thromboembolic phenomenon and spontaneous pneumothoraces.   2. Wean FiO2 as tolerated.       Time at the bedside, reviewing labs and radiographs, reviewing updated notes and consultations, discussing with staff and family was more than 25 minutes. Please note that voice recognition technology was used in the preparation of this note and make therefore it may contain inadvertent transcription errors. If the patient is a COVID 19 isolation patient, the above physical exam reflects that of the examining physician for the day. Stan Culp MD,  MSURAJ., F.C.C.P.     [facility], [address] [place] [address], [address] [place] [address]

## 2021-08-26 NOTE — PLAN OF CARE
Problem: Airway Clearance - Ineffective  Goal: Achieve or maintain patent airway  Outcome: Met This Shift     Problem: Gas Exchange - Impaired  Goal: Absence of hypoxia  Outcome: Met This Shift     Problem: Gas Exchange - Impaired  Goal: Promote optimal lung function  Outcome: Met This Shift

## 2021-08-26 NOTE — PROGRESS NOTES
felt.   Genitourinary: Male  Extremities: No edema.   Lines: peripheral    Laboratory and Tests Review:  Lab Results   Component Value Date    WBC 13.6 (H) 08/26/2021    WBC 13.9 (H) 08/25/2021    WBC 15.3 (H) 08/24/2021    HGB 13.5 08/26/2021    HCT 40.1 08/26/2021    MCV 79.4 (L) 08/26/2021     08/26/2021     Lab Results   Component Value Date    NEUTROABS 11.92 (H) 08/26/2021    NEUTROABS 12.20 (H) 08/25/2021    NEUTROABS 13.53 (H) 08/24/2021     No results found for: [facility]  Lab Results   Component Value Date    ALT 84 (H) 08/26/2021    AST 48 (H) 08/26/2021    ALKPHOS 103 08/26/2021    BILITOT 0.4 08/26/2021     Lab Results   Component Value Date     08/26/2021    K 5.2 08/26/2021    K 4.4 08/23/2021    CL 98 08/26/2021    CO2 26 08/26/2021    BUN 24 08/26/2021    CREATININE 1.0 08/26/2021    CREATININE 0.9 08/25/2021    CREATININE 0.9 08/24/2021    GFRAA >60 08/26/2021    LABGLOM >60 08/26/2021    GLUCOSE 228 08/26/2021    PROT 5.9 08/26/2021    LABALBU 3.7 08/26/2021    CALCIUM 8.5 08/26/2021    BILITOT 0.4 08/26/2021    ALKPHOS 103 08/26/2021    AST 48 08/26/2021    ALT 84 08/26/2021     Lab Results   Component Value Date    CRP 1.6 (H) 08/24/2021    CRP 6.4 (H) 08/23/2021    CRP 11.6 (H) 08/22/2021     No results found for: [address]    Radiology:  Reviewed    Microbiology:   Streptococcus pneumoniae/Legionella urine Ag: negative  Respiratory cx: oral betsy present     SARS-COV-2 biomarkers    Recent Labs     08/24/21  1500 08/25/21  1350 08/25/21  1356 08/26/21  0358   CRP 1.6*  --   --   --    PROCAL 0.17*  --  0.10*  --    DDIMER 348 603  --   --    AST 52* 53*  --  48*   ALT 50* 70*  --  84*     Lab Results   Component Value Date    CHOL 262 06/23/2016    TRIG 188 06/23/2016    HDL 38 06/23/2016    LDLCALC 186 06/23/2016    LABVLDL 38 06/23/2016     Lab Results   Component Value Date/Time    VITD25 25 (L) 08/23/2021 01:16 PM       ASSESSMENT:  · Severe Covid pneumonia with cytokine storm  · Doubt superimposed bacterial pneumonia with decreasing pro-eligio &  CRP  · Leukocytosis, secondary to IV steroids    PLAN:  · Continue Remdesivir day 4  · Dexamethasone 6mg IV BID  · crp 1.6, porcal  0.1  · Had Tosi x1  · Supportive care - SMI, proning   · Monitor labs  · D/W wife  · Oxygen has been weaned back down    ANTON Corbin - CNP  3:20 PM   8/26/2021   Pt seen and examined. Above discussed agree with advanced practice nurse. Labs, cultures, and radiographs reviewed. Face to Face encounter occurred. Changes made as necessary.      Melany Cary MD

## 2021-08-26 NOTE — PROGRESS NOTES
[facility] Progress Note    Admitting Date and Time: 8/22/2021  8:24 AM  Admit Dx: Pneumonia due to COVID-19 virus [U07.1, J12.82]    Subjective:  Patient is being followed for Pneumonia due to COVID-19 virus [U07.1, J12.82]   Pt is with some improvement, O2 requirement is down to 9 L    ROS: denies fever, chills, n/v, HA unless stated above.      insulin lispro  0-12 Units Subcutaneous TID WC    insulin lispro  0-6 Units Subcutaneous Nightly    sodium chloride flush  5-40 mL IntraVENous 2 times per day    enoxaparin  30 mg Subcutaneous BID    Vitamin D  6,000 Units Oral Daily    Followed by   Sheryn Form ON 8/29/2021] Vitamin D  2,000 Units Oral Daily    dexamethasone  6 mg IntraVENous Q12H    ascorbic acid  500 mg Oral BID    zinc sulfate  50 mg Oral Daily    remdesivir IVPB  100 mg IntraVENous Q24H     glucose, 15 g, PRN  dextrose, 12.5 g, PRN  glucagon (rDNA), 1 mg, PRN  dextrose, 100 mL/hr, PRN  melatonin, 3 mg, Nightly PRN  sodium chloride flush, 5-40 mL, PRN  sodium chloride, 25 mL, PRN  ondansetron, 4 mg, Q8H PRN   Or  ondansetron, 4 mg, Q6H PRN  polyethylene glycol, 17 g, Daily PRN  acetaminophen, 650 mg, Q6H PRN   Or  acetaminophen, 650 mg, Q6H PRN  guaiFENesin-dextromethorphan, 5 mL, Q4H PRN  aluminum & magnesium hydroxide-simethicone, 30 mL, Q6H PRN  albuterol sulfate HFA, 2 puff, Q6H PRN  sodium chloride, 30 mL, PRN         Objective:    BP (!) 157/80   Pulse 72   Temp 96.7 °F (35.9 °C) (Oral)   Resp 16   Ht 5' 11\" (1.803 m)   Wt 189 lb 8 oz (86 kg)   SpO2 98%   BMI 26.43 kg/m²     General Appearance: alert and oriented to person, place and time and in no acute distress  Skin: warm and dry  Head: normocephalic and atraumatic  Eyes: pupils equal, round, and reactive to light, extraocular eye movements intact, conjunctivae normal  Neck: neck supple and non tender without mass   Pulmonary/Chest: Diminished bilaterally- no wheezes, rales or rhonchi, normal air movement, mild respiratory distress  Cardiovascular: normal rate, normal S1 and S2 and no carotid bruits  Abdomen: soft, non-tender, non-distended, normal bowel sounds, no masses or organomegaly  Extremities: no cyanosis, no clubbing and no edema  Neurologic: no cranial nerve deficit and speech normal        Recent Labs     08/24/21  1500 08/25/21  1350 08/26/21  0358    130* 131*   K 4.7 4.7 5.2*    100 98   CO2 20* 21* 26   BUN 24* 23 24*   CREATININE 0.9 0.9 1.0   GLUCOSE 289* 302* 228*   CALCIUM 8.4* 8.4* 8.5*       Recent Labs     08/24/21  1500 08/25/21  1350 08/26/21  0358   WBC 15.3* 13.9* 13.6*   RBC 5.20 5.00 5.05   HGB 14.1 13.4 13.5   HCT 41.9 40.2 40.1   MCV 80.6 80.4 79.4*   MCH 27.1 26.8 26.7   MCHC 33.7 33.3 33.7   RDW 14.9 14.7 14.6    353 303   MPV 9.3 9.4 9.3         Assessment:    Principal Problem:    Pneumonia due to COVID-19 virus  Active Problems:    Acute respiratory failure with hypoxia (HCC)  Resolved Problems:    * No resolved hospital problems. *      Plan:  1. Acute hypoxic respiratory failure due to COVID-19 pneumonia, presented with a saturation 87% on room air, requiring 9 L oxygen nasal cannula to maintain a saturation above 90%. Treat underlying process and wean off oxygen. 2.  COVID-19 pneumonia, started on Decadron, ID started the patient on remdesivir and the patient received a dose of Tocilizumab. Monitor inflammatory markers. Repeated procal is down, no need for abx. 3.  Lactic acidosis, mostly due to dehydration secondary to COVID-19 infection, treated with IV fluids and improved  4. Acute encephalopathy most likely due to hypoxia, no neurological deficit, no concern for stroke,. resolved  5. Hyperglycemia due to steroids, patient with no history of diabetes, will obtain A1c start the patient on moderate dose sliding scale insulin. NOTE: This report was transcribed using voice recognition software.  Every effort was made to ensure accuracy; however, inadvertent computerized transcription errors may be present.   Electronically signed by Giovany Preston MD on 8/26/2021 at 8:59 AM

## 2021-08-26 NOTE — CONSULTS
8/26/2021  2:52 PM      Nutrition Assessment     Type and Reason for Visit: Initial, Consult (Consult re: ONS requested by patient)    Nutrition Recommendations/Plan: Continue current diet and ONS, as tolerated. Consider Carb controlled diet if blood sugars are uncontrolled while pt on steroid    Nutrition Assessment:  Pt admit 2/2 Covid +PNA. No hx. DM but current hyperglycemia on steroid. Initiated ONS approp for DM diet to avoid aggravating BGL control. Nutrition Related Findings: A&Ox4, no edema, -I/O 1.7, abd WDL, decreased appetite PTA, hyperglycemia, hyperkalemia, hyponatremia      Current Nutrition Therapies:    ADULT DIET; Regular  Adult Oral Nutrition Supplement;  Low Calorie/High Protein Oral Supplement  Adult Oral Nutrition Supplement; Diabetic Oral Supplement    Anthropometric Measures:  · Height: 5' 11\" (180.3 cm)  · Current Body Wt: 189 lb (85.7 kg) (8/26 bedscale)   · BMI: 26.4      Nutrition Interventions:   Food and/or Nutrient Delivery:  Continue Current Diet, Continue Oral Nutrition Supplement  Nutrition Education/Counseling:  No recommendation at this time   Coordination of Nutrition Care:  Continue to monitor while inpatient    Goals:  PO remain >75% at meals/ONS       Nutrition Monitoring and Evaluation:   Behavioral-Environmental Outcomes:  None Identified   Food/Nutrient Intake Outcomes:  Food and Nutrient Intake, Supplement Intake  Physical Signs/Symptoms Outcomes:  Biochemical Data, GI Status, Fluid Status or Edema, Hemodynamic Status, Nutrition Focused Physical Findings, Skin, Weight     Discharge Planning:    No discharge needs at this time     Electronically signed by Kimberly Harley RD, CNSC, LD on 8/26/21 at 2:52 PM EDT    Contact: [phone]

## 2021-08-27 LAB
ALBUMIN SERPL-MCNC: 3.5 G/DL (ref 3.5–5.2)
ALP BLD-CCNC: 110 U/L (ref 40–129)
ALT SERPL-CCNC: 90 U/L (ref 0–40)
ANION GAP SERPL CALCULATED.3IONS-SCNC: 11 MMOL/L (ref 7–16)
ANION GAP SERPL CALCULATED.3IONS-SCNC: 14 MMOL/L (ref 7–16)
AST SERPL-CCNC: 31 U/L (ref 0–39)
BASOPHILS ABSOLUTE: 0.03 E9/L (ref 0–0.2)
BASOPHILS RELATIVE PERCENT: 0.3 % (ref 0–2)
BILIRUB SERPL-MCNC: 0.5 MG/DL (ref 0–1.2)
BILIRUBIN URINE: NEGATIVE
BLOOD, URINE: NEGATIVE
BUN BLDV-MCNC: 26 MG/DL (ref 6–23)
BUN BLDV-MCNC: 28 MG/DL (ref 6–23)
CALCIUM SERPL-MCNC: 8.1 MG/DL (ref 8.6–10.2)
CALCIUM SERPL-MCNC: 8.4 MG/DL (ref 8.6–10.2)
CHLORIDE BLD-SCNC: 92 MMOL/L (ref 98–107)
CHLORIDE BLD-SCNC: 93 MMOL/L (ref 98–107)
CHLORIDE URINE RANDOM: 34 MMOL/L
CLARITY: CLEAR
CO2: 20 MMOL/L (ref 22–29)
CO2: 21 MMOL/L (ref 22–29)
COLOR: YELLOW
CREAT SERPL-MCNC: 0.9 MG/DL (ref 0.7–1.2)
CREAT SERPL-MCNC: 0.9 MG/DL (ref 0.7–1.2)
CREATININE URINE: 58 MG/DL (ref 40–278)
EOSINOPHILS ABSOLUTE: 0 E9/L (ref 0.05–0.5)
EOSINOPHILS RELATIVE PERCENT: 0 % (ref 0–6)
GFR AFRICAN AMERICAN: >60
GFR AFRICAN AMERICAN: >60
GFR NON-AFRICAN AMERICAN: >60 ML/MIN/1.73
GFR NON-AFRICAN AMERICAN: >60 ML/MIN/1.73
GLUCOSE BLD-MCNC: 269 MG/DL (ref 74–99)
GLUCOSE BLD-MCNC: 337 MG/DL (ref 74–99)
GLUCOSE URINE: >=1000 MG/DL
HCT VFR BLD CALC: 42.1 % (ref 37–54)
HEMOGLOBIN: 14.6 G/DL (ref 12.5–16.5)
IMMATURE GRANULOCYTES #: 0.17 E9/L
IMMATURE GRANULOCYTES %: 1.5 % (ref 0–5)
KETONES, URINE: NEGATIVE MG/DL
LEUKOCYTE ESTERASE, URINE: NEGATIVE
LYMPHOCYTES ABSOLUTE: 0.6 E9/L (ref 1.5–4)
LYMPHOCYTES RELATIVE PERCENT: 5.2 % (ref 20–42)
MCH RBC QN AUTO: 27.1 PG (ref 26–35)
MCHC RBC AUTO-ENTMCNC: 34.7 % (ref 32–34.5)
MCV RBC AUTO: 78.1 FL (ref 80–99.9)
METER GLUCOSE: 208 MG/DL (ref 74–99)
METER GLUCOSE: 211 MG/DL (ref 74–99)
METER GLUCOSE: 251 MG/DL (ref 74–99)
METER GLUCOSE: 284 MG/DL (ref 74–99)
MONOCYTES ABSOLUTE: 0.64 E9/L (ref 0.1–0.95)
MONOCYTES RELATIVE PERCENT: 5.5 % (ref 2–12)
NEUTROPHILS ABSOLUTE: 10.18 E9/L (ref 1.8–7.3)
NEUTROPHILS RELATIVE PERCENT: 87.5 % (ref 43–80)
NITRITE, URINE: NEGATIVE
OSMOLALITY URINE: 608 MOSM/KG (ref 300–900)
OSMOLALITY: 287 MOSM/KG (ref 285–310)
PDW BLD-RTO: 14 FL (ref 11.5–15)
PH UA: 6 (ref 5–9)
PLATELET # BLD: 290 E9/L (ref 130–450)
PMV BLD AUTO: 9.3 FL (ref 7–12)
POTASSIUM SERPL-SCNC: 4.8 MMOL/L (ref 3.5–5)
POTASSIUM SERPL-SCNC: 4.9 MMOL/L (ref 3.5–5)
POTASSIUM, UR: 28.3 MMOL/L
PROTEIN UA: NEGATIVE MG/DL
RBC # BLD: 5.39 E12/L (ref 3.8–5.8)
SODIUM BLD-SCNC: 124 MMOL/L (ref 132–146)
SODIUM BLD-SCNC: 127 MMOL/L (ref 132–146)
SODIUM URINE: 58 MMOL/L
SPECIFIC GRAVITY UA: 1.01 (ref 1–1.03)
T4 FREE: 1.42 NG/DL (ref 0.93–1.7)
TOTAL PROTEIN: 6.2 G/DL (ref 6.4–8.3)
TSH SERPL DL<=0.05 MIU/L-ACNC: 0.55 UIU/ML (ref 0.27–4.2)
URIC ACID, SERUM: 4.1 MG/DL (ref 3.4–7)
UROBILINOGEN, URINE: 0.2 E.U./DL
WBC # BLD: 11.6 E9/L (ref 4.5–11.5)

## 2021-08-27 PROCEDURE — 6370000000 HC RX 637 (ALT 250 FOR IP): Performed by: NURSE PRACTITIONER

## 2021-08-27 PROCEDURE — 1200000000 HC SEMI PRIVATE

## 2021-08-27 PROCEDURE — 99232 SBSQ HOSP IP/OBS MODERATE 35: CPT | Performed by: INTERNAL MEDICINE

## 2021-08-27 PROCEDURE — 84439 ASSAY OF FREE THYROXINE: CPT

## 2021-08-27 PROCEDURE — 83930 ASSAY OF BLOOD OSMOLALITY: CPT

## 2021-08-27 PROCEDURE — 80048 BASIC METABOLIC PNL TOTAL CA: CPT

## 2021-08-27 PROCEDURE — 83935 ASSAY OF URINE OSMOLALITY: CPT

## 2021-08-27 PROCEDURE — 36415 COLL VENOUS BLD VENIPUNCTURE: CPT

## 2021-08-27 PROCEDURE — 6370000000 HC RX 637 (ALT 250 FOR IP): Performed by: INTERNAL MEDICINE

## 2021-08-27 PROCEDURE — 82962 GLUCOSE BLOOD TEST: CPT

## 2021-08-27 PROCEDURE — 6370000000 HC RX 637 (ALT 250 FOR IP): Performed by: FAMILY MEDICINE

## 2021-08-27 PROCEDURE — 84550 ASSAY OF BLOOD/URIC ACID: CPT

## 2021-08-27 PROCEDURE — 80053 COMPREHEN METABOLIC PANEL: CPT

## 2021-08-27 PROCEDURE — 84133 ASSAY OF URINE POTASSIUM: CPT

## 2021-08-27 PROCEDURE — 84443 ASSAY THYROID STIM HORMONE: CPT

## 2021-08-27 PROCEDURE — 85025 COMPLETE CBC W/AUTO DIFF WBC: CPT

## 2021-08-27 PROCEDURE — APPSS30 APP SPLIT SHARED TIME 16-30 MINUTES: Performed by: NURSE PRACTITIONER

## 2021-08-27 PROCEDURE — 81003 URINALYSIS AUTO W/O SCOPE: CPT

## 2021-08-27 PROCEDURE — 84300 ASSAY OF URINE SODIUM: CPT

## 2021-08-27 PROCEDURE — 2700000000 HC OXYGEN THERAPY PER DAY

## 2021-08-27 PROCEDURE — 2580000003 HC RX 258: Performed by: FAMILY MEDICINE

## 2021-08-27 PROCEDURE — 82570 ASSAY OF URINE CREATININE: CPT

## 2021-08-27 PROCEDURE — 6360000002 HC RX W HCPCS: Performed by: NURSE PRACTITIONER

## 2021-08-27 PROCEDURE — 6360000002 HC RX W HCPCS: Performed by: FAMILY MEDICINE

## 2021-08-27 PROCEDURE — 82436 ASSAY OF URINE CHLORIDE: CPT

## 2021-08-27 RX ORDER — DEXAMETHASONE SODIUM PHOSPHATE 4 MG/ML
6 INJECTION, SOLUTION INTRA-ARTICULAR; INTRALESIONAL; INTRAMUSCULAR; INTRAVENOUS; SOFT TISSUE EVERY 24 HOURS
Status: DISCONTINUED | OUTPATIENT
Start: 2021-08-28 | End: 2021-08-28 | Stop reason: HOSPADM

## 2021-08-27 RX ADMIN — ENOXAPARIN SODIUM 30 MG: 30 INJECTION, SOLUTION INTRAVENOUS; SUBCUTANEOUS at 08:42

## 2021-08-27 RX ADMIN — Medication 500 MG: at 08:42

## 2021-08-27 RX ADMIN — Medication 10 ML: at 20:45

## 2021-08-27 RX ADMIN — INSULIN LISPRO 4 UNITS: 100 INJECTION, SOLUTION INTRAVENOUS; SUBCUTANEOUS at 16:46

## 2021-08-27 RX ADMIN — INSULIN LISPRO 5 UNITS: 100 INJECTION, SOLUTION INTRAVENOUS; SUBCUTANEOUS at 20:44

## 2021-08-27 RX ADMIN — Medication 500 MG: at 20:45

## 2021-08-27 RX ADMIN — SODIUM CHLORIDE, PRESERVATIVE FREE 10 ML: 5 INJECTION INTRAVENOUS at 06:05

## 2021-08-27 RX ADMIN — ENOXAPARIN SODIUM 30 MG: 30 INJECTION, SOLUTION INTRAVENOUS; SUBCUTANEOUS at 20:45

## 2021-08-27 RX ADMIN — Medication 10 ML: at 08:42

## 2021-08-27 RX ADMIN — Medication 6000 UNITS: at 08:41

## 2021-08-27 RX ADMIN — INSULIN LISPRO 4 UNITS: 100 INJECTION, SOLUTION INTRAVENOUS; SUBCUTANEOUS at 06:05

## 2021-08-27 RX ADMIN — INSULIN LISPRO 6 UNITS: 100 INJECTION, SOLUTION INTRAVENOUS; SUBCUTANEOUS at 11:37

## 2021-08-27 RX ADMIN — DEXAMETHASONE SODIUM PHOSPHATE 6 MG: 4 INJECTION, SOLUTION INTRA-ARTICULAR; INTRALESIONAL; INTRAMUSCULAR; INTRAVENOUS; SOFT TISSUE at 06:05

## 2021-08-27 RX ADMIN — ZINC SULFATE 220 MG (50 MG) CAPSULE 50 MG: CAPSULE at 08:42

## 2021-08-27 ASSESSMENT — PAIN SCALES - GENERAL
PAINLEVEL_OUTOF10: 0
PAINLEVEL_OUTOF10: 0

## 2021-08-27 NOTE — PROGRESS NOTES
Pulmonary Progress Note    Admit Date: 2021  Hospital day                               PCP: Sofi Nick MD    Chief Complaint (s):  Patient Active Problem List   Diagnosis    Pneumonia due to COVID-19 virus    Acute respiratory failure with hypoxia (HCC)       Subjective:  · . Further decrease in need for oxygen is noted. Hyponatremia is seen as well. Vitals:  VITALS:  BP (!) 145/89   Pulse 62   Temp 97.8 °F (36.6 °C) (Oral)   Resp 20   Ht 5' 11\" (1.803 m)   Wt 187 lb (84.8 kg)   SpO2 92%   BMI 26.08 kg/m²     24HR INTAKE/OUTPUT:      Intake/Output Summary (Last 24 hours) at 2021 1706  Last data filed at 2021 1209  Gross per 24 hour   Intake 1110 ml   Output 450 ml   Net 660 ml       24HR PULSE OXIMETRY RANGE:    SpO2  Av.4 %  Min: 92 %  Max: 93 %    Medications:  IV:   dextrose      sodium chloride         Scheduled Meds:   [START ON 2021] dexamethasone  6 mg IntraVENous Q24H    insulin lispro  0-12 Units Subcutaneous TID WC    insulin lispro  0-6 Units Subcutaneous Nightly    sodium chloride flush  5-40 mL IntraVENous 2 times per day    enoxaparin  30 mg Subcutaneous BID    Vitamin D  6,000 Units Oral Daily    Followed by   Carmen Naqvi ON 2021] Vitamin D  2,000 Units Oral Daily    ascorbic acid  500 mg Oral BID    zinc sulfate  50 mg Oral Daily       Diet:   Adult Oral Nutrition Supplement; Low Calorie/High Protein Oral Supplement  Adult Oral Nutrition Supplement; Diabetic Oral Supplement  ADULT DIET; Regular; 1200 ml     EXAM:  General: No distress. Alert. Eyes: PERRL. No sclera icterus. No conjunctival injection. ENT: No discharge. Pharynx clear. Neck: Trachea midline. Normal thyroid. Resp: No accessory muscle use. bilateral rales. no wheezing. no rhonchi. CV: Regular rate. Regular rhythm. No murmur or rub. Abd: Non-tender. Non-distended. No masses. No organomegaly. Normal bowel sounds. Skin: Warm and dry.  No nodule on exposed extremities. No rash on exposed extremities. Ext: No cyanosis, clubbing, edema  Lymph: No cervical LAD. No supraclavicular LAD. M/S: No cyanosis. No joint deformity. No clubbing. Neuro: Awake. Follows commands. Positive pupils/gag/corneals. Normal pain response. Results:  CBC:   Recent Labs     08/25/21  1350 08/26/21  0358 08/27/21  1325   WBC 13.9* 13.6* 11.6*   HGB 13.4 13.5 14.6   HCT 40.2 40.1 42.1   MCV 80.4 79.4* 78.1*    303 290     BMP:   Recent Labs     08/25/21  1350 08/26/21  0358 08/27/21  1325   * 131* 124*   K 4.7 5.2* 4.8    98 92*   CO2 21* 26 21*   BUN 23 24* 28*   CREATININE 0.9 1.0 0.9     LIVER PROFILE:   Recent Labs     08/25/21  1350 08/26/21  0358 08/27/21  1325   AST 53* 48* 31   ALT 70* 84* 90*   BILITOT 0.4 0.4 0.5   ALKPHOS 125 103 110     PT/INR:   No results for input(s): PROTIME, INR in the last 72 hours. APTT:   No results for input(s): APTT in the last 72 hours. Pathology:  1. N/A      Microbiology:  1. None    Recent ABG:   Recent Labs     08/25/21  0206   PH 7.413   PO2 60.7*   PCO2 31.6*   HCO3 19.7*   BE -3.6*   O2SAT 90.9*   METHB 0.3   O2HB 90.1*   COHB 0.6   O2CON 20.1   HHB 9.0*   THB 15.9             Recent Films:  XR CHEST PORTABLE   Final Result   Stable bilateral pulmonary infiltrates consistent with patient's history of   COVID-19 pneumonia.                      Assessment:  1. COVID-19 pneumonia in an unvaccinated individual.  There is no significant underlying lung disease. he has received Decadron, Remdesivir and Toci  2. Hyponatremia     Plan:  1. Continue current care. Vigilance for bacterial superinfection, thromboembolic phenomenon and spontaneous pneumothoraces. 2. Wean FiO2 as tolerated.       Time at the bedside, reviewing labs and radiographs, reviewing updated notes and consultations, discussing with staff and family was more than 25 minutes.     Please note that voice recognition technology was used in the preparation of this note and make therefore it may contain inadvertent transcription errors. If the patient is a COVID 19 isolation patient, the above physical exam reflects that of the examining physician for the day. Mitch Anderson MD,  MSURAJ., F.C.C.P.     [facility], [address] [place] [address], [address] [place] [address]

## 2021-08-27 NOTE — PROGRESS NOTES
[address] [place] [address] [facility]  Progress Note    Chief complaint: Shortness of breath    SUBJECTIVE:  Patient is lying in bed. Has been up most of the day  Feeling better  5LNC - oxygen requirements have improved. Review of systems:  As stated above in the chief complaint, otherwise negative. Medications:  Scheduled Meds:   insulin lispro  0-12 Units Subcutaneous TID WC    insulin lispro  0-6 Units Subcutaneous Nightly    sodium chloride flush  5-40 mL IntraVENous 2 times per day    enoxaparin  30 mg Subcutaneous BID    Vitamin D  6,000 Units Oral Daily    Followed by    Olya Huff ON 2021] Vitamin D  2,000 Units Oral Daily    dexamethasone  6 mg IntraVENous Q12H    ascorbic acid  500 mg Oral BID    zinc sulfate  50 mg Oral Daily     Continuous Infusions:   dextrose      sodium chloride       PRN Meds:glucose, dextrose, glucagon (rDNA), dextrose, melatonin, sodium chloride flush, sodium chloride, ondansetron **OR** ondansetron, polyethylene glycol, acetaminophen **OR** acetaminophen, guaiFENesin-dextromethorphan, aluminum & magnesium hydroxide-simethicone, albuterol sulfate HFA, sodium chloride    OBJECTIVE:  /62   Pulse 64   Temp 97.2 °F (36.2 °C) (Oral)   Resp 20   Ht 5' 11\" (1.803 m)   Wt 187 lb (84.8 kg)   SpO2 93%   BMI 26.08 kg/m²   Temp  Av.3 °F (36.3 °C)  Min: 97.1 °F (36.2 °C)  Max: 97.5 °F (36.4 °C)  Constitutional: The patient is awake, alert, and oriented. Lying in bed. In no distress. Skin: Warm and dry. No rashes were noted. HEENT: Round and reactive pupils. Moist mucous membranes. No ulcerations or thrush. Neck: Supple to movements. Chest: No use of accessory muscles to breathe. Symmetrical expansion. Diminshed, poor overall air exchange. Cardiovascular: S1 and S2 are rhythmic and regular. No murmurs appreciated. Abdomen: Positive bowel sounds to auscultation. Benign to palpation. No masses felt.    Genitourinary: Male  Extremities: No edema.   Lines: peripheral    Laboratory and Tests Review:  Lab Results   Component Value Date    WBC 13.6 (H) 08/26/2021    WBC 13.9 (H) 08/25/2021    WBC 15.3 (H) 08/24/2021    HGB 13.5 08/26/2021    HCT 40.1 08/26/2021    MCV 79.4 (L) 08/26/2021     08/26/2021     Lab Results   Component Value Date    NEUTROABS 11.92 (H) 08/26/2021    NEUTROABS 12.20 (H) 08/25/2021    NEUTROABS 13.53 (H) 08/24/2021     No results found for: [facility]  Lab Results   Component Value Date    ALT 84 (H) 08/26/2021    AST 48 (H) 08/26/2021    ALKPHOS 103 08/26/2021    BILITOT 0.4 08/26/2021     Lab Results   Component Value Date     08/26/2021    K 5.2 08/26/2021    K 4.4 08/23/2021    CL 98 08/26/2021    CO2 26 08/26/2021    BUN 24 08/26/2021    CREATININE 1.0 08/26/2021    CREATININE 0.9 08/25/2021    CREATININE 0.9 08/24/2021    GFRAA >60 08/26/2021    LABGLOM >60 08/26/2021    GLUCOSE 228 08/26/2021    PROT 5.9 08/26/2021    LABALBU 3.7 08/26/2021    CALCIUM 8.5 08/26/2021    BILITOT 0.4 08/26/2021    ALKPHOS 103 08/26/2021    AST 48 08/26/2021    ALT 84 08/26/2021     Lab Results   Component Value Date    CRP 1.6 (H) 08/24/2021    CRP 6.4 (H) 08/23/2021    CRP 11.6 (H) 08/22/2021     No results found for: [address]    Radiology:  Reviewed    Microbiology:   Streptococcus pneumoniae/Legionella urine Ag: negative  Respiratory cx: oral betsy present     SARS-COV-2 biomarkers    Recent Labs     08/24/21  1500 08/25/21  1350 08/25/21  1356 08/26/21  0358   CRP 1.6*  --   --   --    PROCAL 0.17*  --  0.10*  --    DDIMER 348 603  --   --    AST 52* 53*  --  48*   ALT 50* 70*  --  84*     Lab Results   Component Value Date    CHOL 262 06/23/2016    TRIG 188 06/23/2016    HDL 38 06/23/2016    LDLCALC 186 06/23/2016    LABVLDL 38 06/23/2016     Lab Results   Component Value Date/Time    VITD25 25 (L) 08/23/2021 01:16 PM       ASSESSMENT:  Severe Covid pneumonia with cytokine storm  Doubt superimposed bacterial pneumonia with decreasing pro-eligio &  CRP  Leukocytosis, secondary to IV steroids - improving     PLAN:  Complete Remdesivir today  Continue steroids  Awaiting home oxygen. Ok to discharge from ID standpoint  ID will sign off     ANTON Escalera - CNP  1:29 PM   8/27/2021   Pt seen and examined. Above discussed agree with advanced practice nurse. Labs, cultures, and radiographs reviewed. Face to Face encounter occurred. Changes made as necessary.      Kaaren Favre, MD

## 2021-08-27 NOTE — CARE COORDINATION
Social Work/Discharge Planning:  Chart reviewed. Patient requiring 6 liter high flow oxygen. Called patient and discussed DME options and he will use [employer] if oxygen is needed at discharge. Tentative referral made to [facility] [place] with [facility]. Patient will a pulse oxygen testing and an order for oxygen if needed. [employer] contact information will need to be added to discharge instructions if patient requires oxygen at discharge. Will continue to follow.   Electronically signed by LEIA Ackerman on 8/27/2021 at 8:59 AM

## 2021-08-27 NOTE — PROGRESS NOTES
[address] [place] [address] Hospitalist   Progress Note    Admitting Date and Time: 8/22/2021  8:24 AM  Admit Dx: Pneumonia due to COVID-19 virus [U07.1, J12.82]    Subjective:    Pt feels better today. Patient sitting up in chair no acute distress. Patient currently on 5 LNC tolerating well. Continues to use IS. Patient states he is doing all he can to get home. Patient does self pronate. Denies any new concerns. Per RN: Patient has been able to wean down to 5 LNC. Received last dose of remdesivir last evening. Will likely need oxygen upon DC.    ROS: denies fever, chills, cp, n/v, HA unless stated above.      insulin lispro  0-12 Units Subcutaneous TID WC    insulin lispro  0-6 Units Subcutaneous Nightly    sodium chloride flush  5-40 mL IntraVENous 2 times per day    enoxaparin  30 mg Subcutaneous BID    Vitamin D  6,000 Units Oral Daily    Followed by   Reginaldoyn Form ON 8/29/2021] Vitamin D  2,000 Units Oral Daily    dexamethasone  6 mg IntraVENous Q12H    ascorbic acid  500 mg Oral BID    zinc sulfate  50 mg Oral Daily     glucose, 15 g, PRN  dextrose, 12.5 g, PRN  glucagon (rDNA), 1 mg, PRN  dextrose, 100 mL/hr, PRN  melatonin, 3 mg, Nightly PRN  sodium chloride flush, 5-40 mL, PRN  sodium chloride, 25 mL, PRN  ondansetron, 4 mg, Q8H PRN   Or  ondansetron, 4 mg, Q6H PRN  polyethylene glycol, 17 g, Daily PRN  acetaminophen, 650 mg, Q6H PRN   Or  acetaminophen, 650 mg, Q6H PRN  guaiFENesin-dextromethorphan, 5 mL, Q4H PRN  aluminum & magnesium hydroxide-simethicone, 30 mL, Q6H PRN  albuterol sulfate HFA, 2 puff, Q6H PRN  sodium chloride, 30 mL, PRN         Objective:    /62   Pulse 64   Temp 97.2 °F (36.2 °C) (Oral)   Resp 20   Ht 5' 11\" (1.803 m)   Wt 187 lb (84.8 kg)   SpO2 92%   BMI 26.08 kg/m²   General Appearance: alert and oriented to person, place and time and in no acute distress  Skin: warm and dry  Head: normocephalic and atraumatic  Eyes: pupils equal, round, and reactive to light, extraocular eye movements intact, conjunctivae normal  Neck: neck supple and non tender without mass   Pulmonary/Chest: clear to auscultation bilaterally- no wheezes, rales or rhonchi, normal air movement, no respiratory distress  Cardiovascular: normal rate, normal S1 and S2 and no rubs, gallops, murmur noted. Abdomen: soft, non-tender, non-distended, normal bowel sounds  Extremities: no cyanosis, no clubbing and no edema  Neurologic: no cranial nerve deficit and speech normal      Recent Labs     08/24/21  1500 08/25/21  1350 08/26/21  0358    130* 131*   K 4.7 4.7 5.2*    100 98   CO2 20* 21* 26   BUN 24* 23 24*   CREATININE 0.9 0.9 1.0   GLUCOSE 289* 302* 228*   CALCIUM 8.4* 8.4* 8.5*       Recent Labs     08/24/21  1500 08/25/21  1350 08/26/21  0358   ALKPHOS 119 125 103   PROT 6.9 6.3* 5.9*   LABALBU 3.5 3.3* 3.7   BILITOT 0.3 0.4 0.4   AST 52* 53* 48*   ALT 50* 70* 84*       Recent Labs     08/24/21  1500 08/25/21  1350 08/26/21  0358   WBC 15.3* 13.9* 13.6*   RBC 5.20 5.00 5.05   HGB 14.1 13.4 13.5   HCT 41.9 40.2 40.1   MCV 80.6 80.4 79.4*   MCH 27.1 26.8 26.7   MCHC 33.7 33.3 33.7   RDW 14.9 14.7 14.6    353 303   MPV 9.3 9.4 9.3           Radiology:   XR CHEST PORTABLE   Final Result   Stable bilateral pulmonary infiltrates consistent with patient's history of   COVID-19 pneumonia. Assessment:  Principal Problem:    Pneumonia due to COVID-19 virus  Active Problems:    Acute respiratory failure with hypoxia (HCC)  Resolved Problems:    * No resolved hospital problems. *      Plan:  1. COVID-19 Viral Pneumonia-completed remdesivir last night. Received 1 dose of Tocilizumab. Continues on dexamethasone. Continue to follow inflammatory markers/vitamin supplementation/I-S/pronating. Continue supportive care. ID S/O  2. Acute respiratory Failure with hypoxia-currently requiring 5LNC. Down from 9L NC yesterday. Wean as tolerated. Maintain SPO2>92%.   Continue to encourage incentive spirometry/pronating. 3. Leukocytosis-reactive/steroids. Afebrile. WBC 11.6. Down from 13.6. Continue to follow  4. Lactic Acidosis-2/2 dehydration? Improved with IVF hydration. 5. Acute Encephalopathy-Resolved. Thought to be secondary to hypoxia. No neurological deficits noted. 6. Hyperglycemia-likely 2/2 steroids. No history DM. A1c 6.7. Continue SSI/hypoglycemic protocol. Continue to follow      NOTE: This report was transcribed using voice recognition software. Every effort was made to ensure accuracy; however, inadvertent computerized transcription errors may be present. Electronically signed by Stacey Vigil CNP on 8/27/2021 at 10:14 AM   HOSPITALIST ATTENDING PHYSICIAN NOTE 8/27/2021 2017PM:    Details of the evaluation - subjective assessment (including medication profile, past medical, family and social history when applicable), examination, review of lab and test data, diagnostic impressions and medical decision making - performed by Petra Vigil CNP, were discussed with me on the date of service and I agree with clinical information herein unless otherwise noted. The patient has been evaluated by me personally earlier today. Pt reports no fevers, chills,n/v.     Exam: heart reg at rate of 60,lungs cta, abd pos bs soft nt, ext neg for le edema    I agree with the assessment and plan of Petra Vigil CNP    Acute respiratory failure with hypoxia(87%)POA  Pneumonia due to covid 19   hyponatremia  Acidosis/lactic acidosis  Acute encephalopathy possibly due to hypoxia  hyperglycemia likely due to stress/steroids  Elevated lfts      Electronically signed by Brook Arriaga D.O.   Hospitalist  [facility] at [facility]

## 2021-08-28 VITALS
DIASTOLIC BLOOD PRESSURE: 66 MMHG | TEMPERATURE: 96.8 F | HEART RATE: 61 BPM | OXYGEN SATURATION: 93 % | HEIGHT: 71 IN | SYSTOLIC BLOOD PRESSURE: 128 MMHG | WEIGHT: 190 LBS | RESPIRATION RATE: 18 BRPM | BODY MASS INDEX: 26.6 KG/M2

## 2021-08-28 LAB
ALBUMIN SERPL-MCNC: 3.3 G/DL (ref 3.5–5.2)
ALP BLD-CCNC: 91 U/L (ref 40–129)
ALT SERPL-CCNC: 77 U/L (ref 0–40)
ANION GAP SERPL CALCULATED.3IONS-SCNC: 11 MMOL/L (ref 7–16)
ANION GAP SERPL CALCULATED.3IONS-SCNC: 8 MMOL/L (ref 7–16)
AST SERPL-CCNC: 24 U/L (ref 0–39)
BASOPHILS ABSOLUTE: 0.02 E9/L (ref 0–0.2)
BASOPHILS RELATIVE PERCENT: 0.2 % (ref 0–2)
BILIRUB SERPL-MCNC: 0.6 MG/DL (ref 0–1.2)
BUN BLDV-MCNC: 25 MG/DL (ref 6–23)
BUN BLDV-MCNC: 33 MG/DL (ref 6–23)
CALCIUM IONIZED: 1.19 MMOL/L (ref 1.15–1.33)
CALCIUM SERPL-MCNC: 7.9 MG/DL (ref 8.6–10.2)
CALCIUM SERPL-MCNC: 8.1 MG/DL (ref 8.6–10.2)
CHLORIDE BLD-SCNC: 97 MMOL/L (ref 98–107)
CHLORIDE BLD-SCNC: 99 MMOL/L (ref 98–107)
CO2: 20 MMOL/L (ref 22–29)
CO2: 24 MMOL/L (ref 22–29)
CREAT SERPL-MCNC: 0.9 MG/DL (ref 0.7–1.2)
CREAT SERPL-MCNC: 1 MG/DL (ref 0.7–1.2)
EOSINOPHILS ABSOLUTE: 0.03 E9/L (ref 0.05–0.5)
EOSINOPHILS RELATIVE PERCENT: 0.3 % (ref 0–6)
GFR AFRICAN AMERICAN: >60
GFR AFRICAN AMERICAN: >60
GFR NON-AFRICAN AMERICAN: >60 ML/MIN/1.73
GFR NON-AFRICAN AMERICAN: >60 ML/MIN/1.73
GLUCOSE BLD-MCNC: 191 MG/DL (ref 74–99)
GLUCOSE BLD-MCNC: 195 MG/DL (ref 74–99)
HCT VFR BLD CALC: 41.5 % (ref 37–54)
HEMOGLOBIN: 14.2 G/DL (ref 12.5–16.5)
IMMATURE GRANULOCYTES #: 0.18 E9/L
IMMATURE GRANULOCYTES %: 1.7 % (ref 0–5)
LYMPHOCYTES ABSOLUTE: 1.15 E9/L (ref 1.5–4)
LYMPHOCYTES RELATIVE PERCENT: 11.1 % (ref 20–42)
MCH RBC QN AUTO: 26.9 PG (ref 26–35)
MCHC RBC AUTO-ENTMCNC: 34.2 % (ref 32–34.5)
MCV RBC AUTO: 78.7 FL (ref 80–99.9)
METER GLUCOSE: 127 MG/DL (ref 74–99)
METER GLUCOSE: 284 MG/DL (ref 74–99)
MONOCYTES ABSOLUTE: 0.76 E9/L (ref 0.1–0.95)
MONOCYTES RELATIVE PERCENT: 7.3 % (ref 2–12)
NEUTROPHILS ABSOLUTE: 8.21 E9/L (ref 1.8–7.3)
NEUTROPHILS RELATIVE PERCENT: 79.4 % (ref 43–80)
PDW BLD-RTO: 13.8 FL (ref 11.5–15)
PLATELET # BLD: 255 E9/L (ref 130–450)
PMV BLD AUTO: 8.9 FL (ref 7–12)
POTASSIUM SERPL-SCNC: 4.4 MMOL/L (ref 3.5–5)
POTASSIUM SERPL-SCNC: 4.8 MMOL/L (ref 3.5–5)
RBC # BLD: 5.27 E12/L (ref 3.8–5.8)
SODIUM BLD-SCNC: 129 MMOL/L (ref 132–146)
SODIUM BLD-SCNC: 130 MMOL/L (ref 132–146)
TOTAL PROTEIN: 6.1 G/DL (ref 6.4–8.3)
WBC # BLD: 10.4 E9/L (ref 4.5–11.5)

## 2021-08-28 PROCEDURE — 2580000003 HC RX 258: Performed by: FAMILY MEDICINE

## 2021-08-28 PROCEDURE — 80053 COMPREHEN METABOLIC PANEL: CPT

## 2021-08-28 PROCEDURE — 82330 ASSAY OF CALCIUM: CPT

## 2021-08-28 PROCEDURE — 36415 COLL VENOUS BLD VENIPUNCTURE: CPT

## 2021-08-28 PROCEDURE — APPSS45 APP SPLIT SHARED TIME 31-45 MINUTES: Performed by: NURSE PRACTITIONER

## 2021-08-28 PROCEDURE — 99239 HOSP IP/OBS DSCHRG MGMT >30: CPT | Performed by: INTERNAL MEDICINE

## 2021-08-28 PROCEDURE — 82962 GLUCOSE BLOOD TEST: CPT

## 2021-08-28 PROCEDURE — 80048 BASIC METABOLIC PNL TOTAL CA: CPT

## 2021-08-28 PROCEDURE — 6370000000 HC RX 637 (ALT 250 FOR IP): Performed by: INTERNAL MEDICINE

## 2021-08-28 PROCEDURE — 6370000000 HC RX 637 (ALT 250 FOR IP): Performed by: NURSE PRACTITIONER

## 2021-08-28 PROCEDURE — 6360000002 HC RX W HCPCS: Performed by: FAMILY MEDICINE

## 2021-08-28 PROCEDURE — 85025 COMPLETE CBC W/AUTO DIFF WBC: CPT

## 2021-08-28 PROCEDURE — 6370000000 HC RX 637 (ALT 250 FOR IP): Performed by: FAMILY MEDICINE

## 2021-08-28 PROCEDURE — 6360000002 HC RX W HCPCS: Performed by: NURSE PRACTITIONER

## 2021-08-28 PROCEDURE — 2700000000 HC OXYGEN THERAPY PER DAY

## 2021-08-28 RX ORDER — GUAIFENESIN/DEXTROMETHORPHAN 100-10MG/5
5 SYRUP ORAL EVERY 4 HOURS PRN
Qty: 120 ML | Refills: 0 | Status: SHIPPED | OUTPATIENT
Start: 2021-08-28 | End: 2021-09-07

## 2021-08-28 RX ORDER — CHOLECALCIFEROL (VITAMIN D3) 50 MCG
2000 TABLET ORAL DAILY
Qty: 30 TABLET | Refills: 0 | Status: SHIPPED | OUTPATIENT
Start: 2021-08-29 | End: 2021-09-29

## 2021-08-28 RX ORDER — DEXAMETHASONE 2 MG/1
TABLET ORAL
Qty: 24 TABLET | Refills: 0 | Status: SHIPPED | OUTPATIENT
Start: 2021-08-28 | End: 2021-08-28 | Stop reason: SDUPTHER

## 2021-08-28 RX ORDER — DEXAMETHASONE 6 MG/1
6 TABLET ORAL
Qty: 10 TABLET | Refills: 0 | Status: SHIPPED | OUTPATIENT
Start: 2021-08-28 | End: 2021-08-28 | Stop reason: SDUPTHER

## 2021-08-28 RX ORDER — ASCORBIC ACID 500 MG
500 TABLET ORAL 2 TIMES DAILY
Qty: 30 TABLET | Refills: 3 | Status: SHIPPED | OUTPATIENT
Start: 2021-08-28 | End: 2021-09-29

## 2021-08-28 RX ORDER — GUAIFENESIN/DEXTROMETHORPHAN 100-10MG/5
5 SYRUP ORAL EVERY 4 HOURS PRN
Qty: 120 ML | Refills: 0 | Status: SHIPPED | OUTPATIENT
Start: 2021-08-28 | End: 2021-08-28

## 2021-08-28 RX ORDER — ZINC SULFATE 50(220)MG
50 CAPSULE ORAL DAILY
Qty: 30 CAPSULE | Refills: 3 | COMMUNITY
Start: 2021-08-29 | End: 2021-09-29

## 2021-08-28 RX ORDER — CHOLECALCIFEROL (VITAMIN D3) 50 MCG
2000 TABLET ORAL DAILY
Qty: 30 TABLET | Refills: 0 | Status: SHIPPED | OUTPATIENT
Start: 2021-08-29 | End: 2021-08-28

## 2021-08-28 RX ORDER — ASCORBIC ACID 500 MG
500 TABLET ORAL 2 TIMES DAILY
Qty: 30 TABLET | Refills: 3 | Status: SHIPPED | OUTPATIENT
Start: 2021-08-28 | End: 2021-08-28

## 2021-08-28 RX ORDER — DEXAMETHASONE 2 MG/1
TABLET ORAL
Qty: 24 TABLET | Refills: 0 | Status: SHIPPED | OUTPATIENT
Start: 2021-08-28 | End: 2021-09-29

## 2021-08-28 RX ORDER — ZINC SULFATE 50(220)MG
50 CAPSULE ORAL DAILY
Qty: 30 CAPSULE | Refills: 3 | COMMUNITY
Start: 2021-08-29 | End: 2021-08-28

## 2021-08-28 RX ADMIN — INSULIN LISPRO 3 UNITS: 100 INJECTION, SOLUTION INTRAVENOUS; SUBCUTANEOUS at 11:26

## 2021-08-28 RX ADMIN — DEXAMETHASONE SODIUM PHOSPHATE 6 MG: 4 INJECTION, SOLUTION INTRAMUSCULAR; INTRAVENOUS at 11:23

## 2021-08-28 RX ADMIN — ENOXAPARIN SODIUM 30 MG: 30 INJECTION, SOLUTION INTRAVENOUS; SUBCUTANEOUS at 11:24

## 2021-08-28 RX ADMIN — Medication 500 MG: at 11:24

## 2021-08-28 RX ADMIN — INSULIN LISPRO 9 UNITS: 100 INJECTION, SOLUTION INTRAVENOUS; SUBCUTANEOUS at 16:10

## 2021-08-28 RX ADMIN — Medication 6000 UNITS: at 11:23

## 2021-08-28 RX ADMIN — Medication 10 ML: at 11:24

## 2021-08-28 RX ADMIN — SALINE NASAL SPRAY 1 SPRAY: 1.5 SOLUTION NASAL at 11:26

## 2021-08-28 RX ADMIN — ZINC SULFATE 220 MG (50 MG) CAPSULE 50 MG: CAPSULE at 11:24

## 2021-08-28 RX ADMIN — SALINE NASAL SPRAY 1 SPRAY: 1.5 SOLUTION NASAL at 16:10

## 2021-08-28 ASSESSMENT — PAIN SCALES - GENERAL
PAINLEVEL_OUTOF10: 0
PAINLEVEL_OUTOF10: 0

## 2021-08-28 NOTE — PROGRESS NOTES
Notified Dr. Petey Melendez of BMP results.  Electronically signed by Donavan Boykin RN on 8/28/2021 at 6:08 AM

## 2021-08-28 NOTE — PROGRESS NOTES
[facility]  [address] [facility], [address], [address] [facility]      Pulmonary Progress Note      SUBJECTIVE:  Sitting up in bed, occ standing and moving around, claims doing ok/better with breathing, on 3 li NC, complaining of nasal dryness due to oxygen use.     OBJECTIVE    Medications    Continuous Infusions:   dextrose      sodium chloride         Scheduled Meds:   dexamethasone  6 mg IntraVENous Q24H    insulin lispro  0-18 Units Subcutaneous TID WC    insulin lispro  0-9 Units Subcutaneous Nightly    sodium chloride flush  5-40 mL IntraVENous 2 times per day    enoxaparin  30 mg Subcutaneous BID    Vitamin D  6,000 Units Oral Daily    Followed by   Santiago Hoffmann ON 2021] Vitamin D  2,000 Units Oral Daily    ascorbic acid  500 mg Oral BID    zinc sulfate  50 mg Oral Daily       PRN Meds:glucose, dextrose, glucagon (rDNA), dextrose, melatonin, sodium chloride flush, sodium chloride, ondansetron **OR** ondansetron, polyethylene glycol, acetaminophen **OR** acetaminophen, guaiFENesin-dextromethorphan, aluminum & magnesium hydroxide-simethicone, albuterol sulfate HFA, sodium chloride    Physical    VITALS:  /78   Pulse 58   Temp 98 °F (36.7 °C) (Oral)   Resp 20   Ht 5' 11\" (1.803 m)   Wt 190 lb (86.2 kg)   SpO2 94%   BMI 26.50 kg/m²     24HR INTAKE/OUTPUT:      Intake/Output Summary (Last 24 hours) at 2021 1010  Last data filed at 2021 1209  Gross per 24 hour   Intake 240 ml   Output --   Net 240 ml       24HR PULSE OXIMETRY RANGE:    SpO2  Av.3 %  Min: 92 %  Max: 95 %    General appearance: alert, appears stated age and cooperative  Lungs: rhonchi bibasilar  Heart: regular rate and rhythm, S1, S2 normal, no murmur, click, rub or gallop  Abdomen: soft, non-tender; bowel sounds normal; no masses,  no organomegaly  Extremities: extremities normal, atraumatic, no cyanosis or edema  Neurologic: Mental status: Alert, oriented, thought content appropriate    Data    CBC:   Recent Labs     08/26/21  0358 08/27/21  1325 08/28/21  0210   WBC 13.6* 11.6* 10.4   HGB 13.5 14.6 14.2   HCT 40.1 42.1 41.5   MCV 79.4* 78.1* 78.7*    290 255       BMP:  Recent Labs     08/27/21  1325 08/27/21  1500 08/28/21  0210   * 127* 129*   K 4.8 4.9 4.8   CL 92* 93* 97*   CO2 21* 20* 24   BUN 28* 26* 25*   CREATININE 0.9 0.9 0.9    ALB:3,BILIDIR:3,BILITOT:3,ALKPHOS:3)@    PT/INR: No results for input(s): PROTIME, INR in the last 72 hours. ABG:   No results for input(s): PH, PO2, PCO2, HCO3, BE, O2SAT, METHB, O2HB, COHB, O2CON, HHB, THB in the last 72 hours. Radiology/Other tests reviewed: none    Assessment:     Principal Problem:    Pneumonia due to COVID-19 virus  Active Problems:    Acute respiratory failure with hypoxia (HCC)    Hyponatremia    Acidosis  Resolved Problems:    * No resolved hospital problems. *      Plan:       1. Cont with steroids, taper as tolerated  2. Cont with oxygen, taper as tolerated  3. Nasal saline to see if helps with dryiness  4. Cont with anticoagulation  5. Hyponatremia as per Renal  6. OOB to chair, ambulate with assistance      Time at the bedside, reviewing labs and radiographs, reviewing notes and consultations, discussing with staff and family was more than 35 minutes. Thanks for letting us see this patient in consultation. Please contact us with any questions. Office [phone] or after hours through [employer], x [phone].

## 2021-08-28 NOTE — CONSULTS
medications on file prior to encounter. Current Outpatient Medications on File Prior to Encounter   Medication Sig Dispense Refill    tamsulosin (FLOMAX) 0.4 MG capsule Take 1 capsule by mouth daily for 5 days 5 capsule 0    ibuprofen (ADVIL;MOTRIN) 800 MG tablet Take 1 tablet by mouth every 6 hours as needed for Pain (Patient not taking: Reported on 5/7/2021) 20 tablet 3    Coenzyme Q10 (CO Q 10 PO) Take by mouth      Red Yeast Rice Extract (RED YEAST RICE PO) Take by mouth         Allergies:    Patient has no known allergies. Social History:     reports that he has never smoked. He has never used smokeless tobacco. He reports that he does not drink alcohol and does not use drugs.     Family History:         Problem Relation Age of Onset    Cancer Mother         cervical    High Cholesterol Father     Alzheimer's Disease Father        ROS:     Pertinent + discussed in HPI, all others negative     Physical Exam:      Patient Vitals for the past 24 hrs:   BP Temp Temp src Pulse Resp SpO2 Weight   08/28/21 0224 -- -- -- -- -- -- 190 lb (86.2 kg)   08/28/21 0040 136/78 98 °F (36.7 °C) Oral 58 20 94 % --   08/27/21 2030 -- -- -- 54 -- 95 % --   08/27/21 1733 -- -- -- -- -- 94 % --   08/27/21 1555 (!) 145/89 97.8 °F (36.6 °C) Oral 62 20 -- --   08/27/21 1457 -- -- -- -- -- 92 % --   08/27/21 1201 -- -- -- -- -- 93 % --   08/27/21 1145 -- -- -- -- -- 92 % --   08/27/21 0938 -- -- -- -- -- 92 % --         Intake/Output Summary (Last 24 hours) at 8/28/2021 0851  Last data filed at 8/27/2021 1209  Gross per 24 hour   Intake 480 ml   Output --   Net 480 ml       PE not personally performed as he is in Covid isolation    Data:    Recent Labs     08/26/21  0358 08/27/21  1325 08/28/21  0210   WBC 13.6* 11.6* 10.4   HGB 13.5 14.6 14.2   HCT 40.1 42.1 41.5   MCV 79.4* 78.1* 78.7*    290 255       Recent Labs     08/27/21  1325 08/27/21  1500 08/28/21  0210   * 127* 129*   K 4.8 4.9 4.8   CL 92* 93* 97* reported Hx Dm  A1C 6.7 on 8/25      Chuy Hoyt, APRN - CNP

## 2021-08-28 NOTE — DISCHARGE SUMMARY
[facility] Hospitalist       Hospitalist Physician Discharge Summary           Schedule an appointment as soon as possible for a visit      Meyer Baumgarten, MD  [address] [phone]    Schedule an appointment as soon as possible for a visit in 1 week  Please call for follow up post hospital stay appt. Activity level:     Diet: Adult Oral Nutrition Supplement; Low Calorie/High Protein Oral Supplement  Adult Oral Nutrition Supplement; Diabetic Oral Supplement  ADULT DIET; Regular; 1200 ml        Condition at discharge: Stable    Dispo: Home    Continue supplemental oxygen via nasal canula @ 2 LPM round-the-clock. Patient ID:  Dorinda Vasques  [id number]  73 y.o.  1955    Admit date: 8/22/2021    Discharge date and time:  8/28/2021  5:10 PM    Admission Diagnoses: Principal Problem:    Pneumonia due to COVID-19 virus  Active Problems:    Acute respiratory failure with hypoxia (HCC)    Hyponatremia    Acidosis  Resolved Problems:    * No resolved hospital problems. *      Discharge Diagnoses: Principal Problem:    Pneumonia due to COVID-19 virus  Active Problems:    Acute respiratory failure with hypoxia (HCC)    Hyponatremia    Acidosis  Resolved Problems:    * No resolved hospital problems. *      Consults:  IP CONSULT TO INFECTIOUS DISEASES  IP CONSULT TO PHARMACY  IP CONSULT TO PULMONOLOGY  IP CONSULT TO PHARMACY  IP CONSULT TO PHARMACY  IP CONSULT TO DIETITIAN  IP CONSULT TO NEPHROLOGY    Procedures: None    Hospital Course:   622/2021 67-year-old male PMH enlarged prostate presented to [facility] ED with complaints of increased shortness of breath. Patient began not feeling well 8/10. Did have exposure at work. 8/12/2021 patient tested positive COVID-19. S/p exposure at work. Symptoms presented with fevers, diarrhea, loss of taste, smell. Initially felt better. Approximately 5 days prior to presentation symptoms worsening.   Developed shortness of breath/COOK/cough/chest tightness. Patient presented to [facility] ED.  SPO2 noted 87%. Patient to admit patient for further evaluation. 1. COVID-19 Viral Pneumonia-completed remdesivir last night. Received 1 dose of Tocilizumab. Continues on dexamethasone. Continue to follow inflammatory markers/vitamin supplementation/I-S/pronating. Continue supportive care. ID S/O  2. Acute respiratory Failure with hypoxia-currently requiring 2LNC. Down from 9L NC. Wean as tolerated. Maintain SPO2>92%. Continue to encourage incentive spirometry/pronating. 3. Leukocytosis-reactive/steroids. Afebrile. WBC 11.6. Down from 13.6. Continue to follow  4. Lactic Acidosis-2/2 dehydration? Improved with IVF hydration. 5. Acute Encephalopathy-Resolved. Thought to be secondary to hypoxia. No neurological deficits noted. 6. Hyperglycemia-likely 2/2 steroids. No history DM. A1c 6.7. Continue SSI/hypoglycemic protocol. Continue to follow    Patient symptoms improved. Oxygen supplementation weaned to 2L NC. Specialties okay with DC. Case management with arrangement of oxygen supplementation at [place]. Patient remained hemodynamic stable and will be discharged at this time. Patient will be instructed to follow-up with PCP upon DC. She will be continued on dexamethasone x4 more days.        Discharge Exam:  Vitals:    08/28/21 1045 08/28/21 1115 08/28/21 1530 08/28/21 1541   BP:  139/64  128/66   Pulse: 62 61     Resp:  18  18   Temp:  97.9 °F (36.6 °C)  96.8 °F (36 °C)   TempSrc:  Oral  Oral   SpO2: 91% 95% 93%    Weight:       Height:         General Appearance: alert and oriented to person, place and time and in no acute distress  Skin: warm and dry  Head: normocephalic and atraumatic  Eyes: pupils equal, round, and reactive to light, extraocular eye movements intact, conjunctivae normal  Neck: neck supple and non tender without mass   Pulmonary/Chest: clear to auscultation bilaterally- no wheezes, rales or rhonchi, normal air movement, no respiratory distress  Cardiovascular: normal rate, normal S1 and S2 and no rubs, gallops, murmur noted. Abdomen: soft, non-tender, non-distended, normal bowel sounds  Extremities: no cyanosis, no clubbing and no edema  Neurologic: no cranial nerve deficit and speech normal    I/O last 3 completed shifts: In: 220 [P.O.:220]  Out: -   No intake/output data recorded. LABS:  Recent Labs     08/27/21  1500 08/28/21  0210 08/28/21  1031   * 129* 130*   K 4.9 4.8 4.4   CL 93* 97* 99   CO2 20* 24 20*   BUN 26* 25* 33*   CREATININE 0.9 0.9 1.0   GLUCOSE 337* 195* 191*   CALCIUM 8.1* 8.1* 7.9*       Recent Labs     08/26/21  0358 08/27/21  1325 08/28/21  0210   WBC 13.6* 11.6* 10.4   RBC 5.05 5.39 5.27   HGB 13.5 14.6 14.2   HCT 40.1 42.1 41.5   MCV 79.4* 78.1* 78.7*   MCH 26.7 27.1 26.9   MCHC 33.7 34.7* 34.2   RDW 14.6 14.0 13.8    290 255   MPV 9.3 9.3 8.9       No results for input(s): POCGLU in the last 72 hours. Imaging:  XR CHEST PORTABLE    Result Date: 8/22/2021  EXAMINATION: ONE XRAY VIEW OF THE CHEST 8/22/2021 3:13 am COMPARISON: None. HISTORY: ORDERING SYSTEM PROVIDED HISTORY: cough, dyspnea 12 days covid positive TECHNOLOGIST PROVIDED HISTORY: Reason for exam:->cough, dyspnea 12 days covid positive FINDINGS: There are bilateral areas of infiltrate. Appearance could be compatible with viral pneumonia. There is no pleural effusion or pneumothorax. There is no cardiomegaly or vascular congestion. Bilateral areas of infiltrate. Appearance could be consistent with viral pneumonia. CTA PULMONARY W CONTRAST    Result Date: 8/22/2021  EXAMINATION: CTA OF THE CHEST 8/22/2021 3:48 am TECHNIQUE: CTA of the chest was performed after the administration of intravenous contrast.  Multiplanar reformatted images are provided for review. MIP images are provided for review.  Dose modulation, iterative reconstruction, and/or weight based adjustment of the mA/kV was utilized to reduce the radiation dose to as low as reasonably achievable. COMPARISON: None. HISTORY: ORDERING SYSTEM PROVIDED HISTORY: recent COvid infection, pleuritic chest pain, r/o PE TECHNOLOGIST PROVIDED HISTORY: Reason for exam:->recent COvid infection, pleuritic chest pain, r/o PE Decision Support Exception - unselect if not a suspected or confirmed emergency medical condition->Emergency Medical Condition (MA) FINDINGS: Pulmonary Arteries: Pulmonary arteries are adequately opacified for evaluation. No evidence of intraluminal filling defect to suggest pulmonary embolism. Main pulmonary artery is normal in caliber. Mediastinum: There are few nonenlarged mediastinal lymph nodes. No abnormal mediastinal lymphadenopathy seen. The heart and pericardium demonstrate no acute abnormality. There is no acute abnormality of the thoracic aorta. Lungs/pleura: There are moderate patchy and ground-glass infiltrates throughout both lungs. Appearance is consistent with COVID pneumonia. There is no pleural effusion. There is no pneumothorax. 1 Upper Abdomen: Limited images of the upper abdomen are unremarkable. Soft Tissues/Bones: No acute bone or soft tissue abnormality. 1. No evidence for pulmonary embolus. 2. Moderate bilateral infiltrates with appearance compatible with COVID pneumonia.          Patient Instructions:   Current Discharge Medication List      START taking these medications    Details   guaiFENesin-dextromethorphan (ROBITUSSIN DM) 100-10 MG/5ML syrup Take 5 mLs by mouth every 4 hours as needed for Cough  Qty: 120 mL, Refills: 0      sodium chloride (OCEAN, BABY AYR) 0.65 % nasal spray 1 spray by Nasal route 2 times daily  Qty: 1 Bottle, Refills: 0      zinc sulfate (ZINCATE) 220 (50 Zn) MG capsule Take 1 capsule by mouth daily  Qty: 30 capsule, Refills: 3      ascorbic acid (VITAMIN C) 500 MG tablet Take 1 tablet by mouth 2 times daily  Qty: 30 tablet, Refills: 3      Vitamin D (CHOLECALCIFEROL) 50 MCG (2000 UT) TABS tablet Take 1 tablet by mouth daily  Qty: 30 tablet, Refills: 0    Comments: Labeling may look different. 25 mcg=1000 Units. Please double check dosages. dexamethasone (DECADRON) 2 MG tablet 3 tabs daily x4 days, 2 tabs daily x4 days, 1 tab daily x4 days then stop  Qty: 24 tablet, Refills: 0         CONTINUE these medications which have NOT CHANGED    Details   tamsulosin (FLOMAX) 0.4 MG capsule Take 1 capsule by mouth daily for 5 days  Qty: 5 capsule, Refills: 0      Coenzyme Q10 (CO Q 10 PO) Take by mouth      Red Yeast Rice Extract (RED YEAST RICE PO) Take by mouth         STOP taking these medications       ibuprofen (ADVIL;MOTRIN) 800 MG tablet Comments:   Reason for Stopping:                 Note that more than 30 minutes was spent in preparing discharge papers, discussing discharge with patient, medication review, etc.    NOTE: This report was transcribed using voice recognition software. Every effort was made to ensure accuracy; however, inadvertent computerized transcription errors may be present. Signed:  Electronically signed by Sarah Baugh [place] - CNP on 8/28/2021 at 5:10 PM   HOSPITALIST ATTENDING PHYSICIAN NOTE 8/28/2021 1954PM:    Details of the evaluation - subjective assessment (including medication profile, past medical, family and social history when applicable), examination, review of lab and test data, diagnostic impressions and medical decision making - performed by Delfino Baugh [place] - CNP, were discussed with me on the date of service and I agree with clinical information herein unless otherwise noted. The patient has been evaluated by me personally earlier today. Pt reports no fevers, chills,n/v. Sob improved    Exam: heart reg at rate of 64,lungs cta, abd pos bs soft nt, ext neg for le edema    I agree with the assessment and plan of Delfino Mckenzie, APRN - CNP    Acute respiratory failure with hypoxia(87%)POA  Pneumonia due to covid 19 hyponatremia  Acidosis/lactic acidosis  Acute encephalopathy possibly due to hypoxia  hyperglycemia likely due to stress/steroids  Elevated lfts    Total time for discharge is 37 min      Electronically signed by Lalita Villagomez D.O.   Hospitalist  [facility] Hospitalist Service at [facility]

## 2021-08-30 ENCOUNTER — CARE COORDINATION (OUTPATIENT)
Dept: CASE MANAGEMENT | Age: 66
End: 2021-08-30

## 2021-08-30 NOTE — CARE COORDINATION
[facility] Transitions Initial Follow Up Call    Call within 2 business days of discharge: Yes    Patient: Vaibhav Oliveira Patient : 1955   MRN: [id number]  Reason for Admission: PNA d/t COVID-19+  Discharge Date: 21 RARS: Readmission Risk Score: 17      Last Discharge [facility]       Complaint Diagnosis Description Type Department Provider    21   Admission (Discharged) HUNTER Hurtado DO    21 Cough; Shortness of Breath Acute hypoxemic respiratory failure due to COVID-19 ([address] . .. ED (TRANSFER) [facility] Yaya Friedman DO        Attempted to contact patient today 21 for hospital discharge/COVID-19+ follow up. Left messages on both home/mobile numbers requesting a return call back to CTN and provided contact information .     Davina Alvarenga, APRN

## 2021-08-31 ENCOUNTER — CARE COORDINATION (OUTPATIENT)
Dept: CASE MANAGEMENT | Age: 66
End: 2021-08-31

## 2021-08-31 NOTE — CARE COORDINATION
[facility] Transitions Initial Follow Up Call    Call within 2 business days of discharge: Yes    Patient: Yesica Blevins Patient : 1955   MRN: [id number]  Reason for Admission: PNA d/t COVID-19+  Discharge Date: 21 RARS: Readmission Risk Score: 17      Last Discharge [facility]       Complaint Diagnosis Description Type Department Provider    21   Admission (Discharged) HUNTER Enriquez DO    21 Cough; Shortness of Breath Acute hypoxemic respiratory failure due to COVID-19 ([facility] [address] . .. ED (TRANSFER) [facility] Jones Zamudio DO        Attempted to contact patient today 21 for hospital discharge/COVID-19+ follow up. Left messages on both home/mobile numbers requesting a return call back to CTN and provided contact information. CTN signing off if no return call.       Pamela Toscano, APRN

## 2021-09-29 ENCOUNTER — OFFICE VISIT (OUTPATIENT)
Dept: PRIMARY CARE CLINIC | Age: 66
End: 2021-09-29
Payer: COMMERCIAL

## 2021-09-29 VITALS
HEART RATE: 89 BPM | DIASTOLIC BLOOD PRESSURE: 79 MMHG | SYSTOLIC BLOOD PRESSURE: 144 MMHG | HEIGHT: 70 IN | WEIGHT: 190.6 LBS | BODY MASS INDEX: 27.29 KG/M2 | OXYGEN SATURATION: 97 % | TEMPERATURE: 97.4 F

## 2021-09-29 DIAGNOSIS — Z76.89 ENCOUNTER TO ESTABLISH CARE WITH NEW DOCTOR: ICD-10-CM

## 2021-09-29 DIAGNOSIS — E11.65 CONTROLLED TYPE 2 DIABETES MELLITUS WITH HYPERGLYCEMIA, WITHOUT LONG-TERM CURRENT USE OF INSULIN (HCC): ICD-10-CM

## 2021-09-29 DIAGNOSIS — E55.9 VITAMIN D DEFICIENCY: ICD-10-CM

## 2021-09-29 DIAGNOSIS — R03.0 ELEVATED BLOOD PRESSURE READING WITHOUT DIAGNOSIS OF HYPERTENSION: ICD-10-CM

## 2021-09-29 DIAGNOSIS — Z86.16 HISTORY OF COVID-19: ICD-10-CM

## 2021-09-29 DIAGNOSIS — R25.2 MUSCLE CRAMPS: ICD-10-CM

## 2021-09-29 DIAGNOSIS — N40.0 BENIGN PROSTATIC HYPERPLASIA, UNSPECIFIED WHETHER LOWER URINARY TRACT SYMPTOMS PRESENT: Primary | ICD-10-CM

## 2021-09-29 DIAGNOSIS — H65.03 BILATERAL ACUTE SEROUS OTITIS MEDIA, RECURRENCE NOT SPECIFIED: ICD-10-CM

## 2021-09-29 PROCEDURE — 3017F COLORECTAL CA SCREEN DOC REV: CPT | Performed by: FAMILY MEDICINE

## 2021-09-29 PROCEDURE — 1123F ACP DISCUSS/DSCN MKR DOCD: CPT | Performed by: FAMILY MEDICINE

## 2021-09-29 PROCEDURE — 36415 COLL VENOUS BLD VENIPUNCTURE: CPT | Performed by: FAMILY MEDICINE

## 2021-09-29 PROCEDURE — 2022F DILAT RTA XM EVC RTNOPTHY: CPT | Performed by: FAMILY MEDICINE

## 2021-09-29 PROCEDURE — G8419 CALC BMI OUT NRM PARAM NOF/U: HCPCS | Performed by: FAMILY MEDICINE

## 2021-09-29 PROCEDURE — G8427 DOCREV CUR MEDS BY ELIG CLIN: HCPCS | Performed by: FAMILY MEDICINE

## 2021-09-29 PROCEDURE — 4040F PNEUMOC VAC/ADMIN/RCVD: CPT | Performed by: FAMILY MEDICINE

## 2021-09-29 PROCEDURE — 3044F HG A1C LEVEL LT 7.0%: CPT | Performed by: FAMILY MEDICINE

## 2021-09-29 PROCEDURE — 1036F TOBACCO NON-USER: CPT | Performed by: FAMILY MEDICINE

## 2021-09-29 PROCEDURE — 99214 OFFICE O/P EST MOD 30 MIN: CPT | Performed by: FAMILY MEDICINE

## 2021-09-29 RX ORDER — CEFDINIR 300 MG/1
300 CAPSULE ORAL 2 TIMES DAILY
Qty: 20 CAPSULE | Refills: 0 | Status: SHIPPED | OUTPATIENT
Start: 2021-09-29 | End: 2021-10-09

## 2021-09-29 RX ORDER — ASPIRIN 81 MG/1
81 TABLET ORAL DAILY
Qty: 90 TABLET | Refills: 1 | COMMUNITY
Start: 2021-09-29 | End: 2022-07-25

## 2021-09-29 ASSESSMENT — ENCOUNTER SYMPTOMS
EYE PAIN: 0
WHEEZING: 0
SHORTNESS OF BREATH: 1
CHEST TIGHTNESS: 0
RHINORRHEA: 0
SORE THROAT: 0
EYE DISCHARGE: 0
BLOOD IN STOOL: 0
DIARRHEA: 0
VOMITING: 0
BACK PAIN: 0
ABDOMINAL PAIN: 0
COLOR CHANGE: 0
NAUSEA: 0
COUGH: 1
SINUS PRESSURE: 0
CONSTIPATION: 0
ABDOMINAL DISTENTION: 0

## 2021-09-29 ASSESSMENT — PATIENT HEALTH QUESTIONNAIRE - PHQ9
1. LITTLE INTEREST OR PLEASURE IN DOING THINGS: 0
2. FEELING DOWN, DEPRESSED OR HOPELESS: 0
SUM OF ALL RESPONSES TO PHQ QUESTIONS 1-9: 0
SUM OF ALL RESPONSES TO PHQ QUESTIONS 1-9: 0
SUM OF ALL RESPONSES TO PHQ9 QUESTIONS 1 & 2: 0
SUM OF ALL RESPONSES TO PHQ QUESTIONS 1-9: 0

## 2021-09-29 NOTE — PROGRESS NOTES
[facility])  Family Medicine Outpatient        SUBJECTIVE:  CC: had concerns including Establish Care. HPI:Nitesh Benignosid presented to the clinic for a routine visit. Chanel Knight is a 73year old male presenting to the office to establish care. He reports having covid-19 in August. He was admitted 8/22-8/28 for Covid pneumonia with acute respiratory failure. He reports he did not have to go home on oxygen. He has been feeling somewhat better since being discharged. Although, he notes having decreased hearing since discharge along with still some cough and congestion. He is still noting some shortness of breath on exertion, but significantly improved from hospitalization. He denies any fevers chills. He does report occasionally getting charley horses in his legs a couple times a week since discharge from hospitalization as well as morning headaches. Denies any vision changes, light or sound sensitivity, or syncope episodes. Review of Systems   Constitutional: Negative for activity change, appetite change, fatigue and fever. HENT: Positive for congestion and ear pain. Negative for ear discharge, postnasal drip, rhinorrhea, sinus pressure, sneezing and sore throat. Eyes: Negative for pain and discharge. Respiratory: Positive for cough (episodic significantly improved) and shortness of breath (episodic significantly improved). Negative for chest tightness and wheezing. Cardiovascular: Negative for chest pain, palpitations and leg swelling. Gastrointestinal: Negative for abdominal distention, abdominal pain, blood in stool, constipation, diarrhea, nausea and vomiting. Endocrine: Negative for cold intolerance and heat intolerance. Genitourinary: Negative for decreased urine volume, frequency and urgency. Musculoskeletal: Negative for arthralgias, back pain, gait problem and myalgias. Skin: Negative for color change and rash.    Allergic/Immunologic: Negative for food allergies and immunocompromised state. Neurological: Positive for headaches. Negative for dizziness, syncope, weakness and numbness. Psychiatric/Behavioral: Negative for agitation, behavioral problems and suicidal ideas. The patient is not nervous/anxious. Outpatient Medications Marked as Taking for the 9/29/21 encounter (Office Visit) with Ami Alamo MD   Medication Sig Dispense Refill    aspirin EC 81 MG EC tablet Take 1 tablet by mouth daily 90 tablet 1    cefdinir (OMNICEF) 300 MG capsule Take 1 capsule by mouth 2 times daily for 10 days 20 capsule 0    tamsulosin (FLOMAX) 0.4 MG capsule Take 1 capsule by mouth daily for 5 days 5 capsule 0       I have reviewed all pertinent PMHx, PSHx, FamHx, SocialHx, medications, and allergies and updated history as appropriate. OBJECTIVE    VS: BP (!) 144/79   Pulse 89   Temp 97.4 °F (36.3 °C)   Ht 5' 10\" (1.778 m)   Wt 190 lb 9.6 oz (86.5 kg)   SpO2 97%   BMI 27.35 kg/m²   Physical Exam  Constitutional:       General: He is not in acute distress. Appearance: He is well-developed. He is not diaphoretic. HENT:      Head: Normocephalic and atraumatic. Right Ear: External ear normal. There is no impacted cerumen. Left Ear: External ear normal. There is no impacted cerumen. Ears:      Comments: Left tympanic membrane bulging with erythema and opacification. Clear fluid behind right tympanic membrane with bulging without erythema     Mouth/Throat:      Mouth: Mucous membranes are moist.      Pharynx: No oropharyngeal exudate or posterior oropharyngeal erythema. Eyes:      Conjunctiva/sclera: Conjunctivae normal.      Pupils: Pupils are equal, round, and reactive to light. Cardiovascular:      Rate and Rhythm: Normal rate and regular rhythm. Pulmonary:      Effort: Pulmonary effort is normal.      Breath sounds: Normal breath sounds. No stridor. No wheezing, rhonchi or rales. Chest:      Chest wall: No tenderness.    Abdominal: General: Bowel sounds are normal. There is no distension. Palpations: Abdomen is soft. Tenderness: There is no abdominal tenderness. Hernia: No hernia is present. Musculoskeletal:         General: No swelling or tenderness. Normal range of motion. Cervical back: Normal range of motion and neck supple. No rigidity. Comments: Negative Homans bilaterally   Lymphadenopathy:      Cervical: Cervical adenopathy present. Skin:     General: Skin is warm and dry. Neurological:      Mental Status: He is alert and oriented to person, place, and time. Sensory: No sensory deficit. Motor: No weakness. Psychiatric:         Mood and Affect: Mood normal.         Behavior: Behavior normal.         ASSESSMENT/PLAN:  1. Benign prostatic hyperplasia, unspecified whether lower urinary tract symptoms present  Patient reports PSA and medication for Flomax done by urology Dr. Allyson Sexton. Continue to follow with specialist.    2. BMI 27.0-27.9,adult  - CBC; Future  - Comprehensive Metabolic Panel; Future  - Lipid Panel; Future  - aspirin EC 81 MG EC tablet; Take 1 tablet by mouth daily  Dispense: 90 tablet; Refill: 1    3. Elevated blood pressure reading without diagnosis of hypertension  Patient denies high blood pressure in the past.  He will keep a blood pressure log at this time. Information provided on low-sodium diet. - Comprehensive Metabolic Panel; Future  - aspirin EC 81 MG EC tablet; Take 1 tablet by mouth daily  Dispense: 90 tablet; Refill: 1    4. Controlled type 2 diabetes mellitus with hyperglycemia, without long-term current use of insulin ([facility])  A1c in hospital 6.7%. Patient denies any medication or diabetic education at this time. His wife is a dietitian. Information provided on carb counting. He denies any glucometer kit be sent to the pharmacy at this time. Repeat A1c in 3 months. - Comprehensive Metabolic Panel; Future    5.  History of COVID-19  With residual headache, cough, leg cramps, and dyspnea on exertion. All which have been proved significantly since hospitalization. Continue to monitor    6. Vitamin D deficiency  - Vitamin D 25 Hydroxy; Future    7. Muscle cramps  - Vitamin B12; Future  - Folate; Future  - Iron and TIBC; Future  - MAGNESIUM; Future    8. Bilateral acute serous otitis media, recurrence not specified  - cefdinir (OMNICEF) 300 MG capsule; Take 1 capsule by mouth 2 times daily for 10 days  Dispense: 20 capsule; Refill: 0    9. Encounter to establish care with new doctor      I have reviewed my findings and recommendations with Girtha Gosselin, MD  9/29/2021 2:03 PM     Counseled regarding above diagnosis, including possible risks and complications, especially if left uncontrolled. Patient counseled on red flag symptoms and if they occur to go to the ED. Discussed medications risk/benefits and possible side effects and alternatives to treatment. Patient and/or guardian verbalizes understanding, agrees, feels comfortable with and wishes to proceed with above treatment plan. Advised patient regarding importance of keeping up with recommended health maintenance and to schedule as soon as possible if overdue, as this is important in assessing for undiagnosed pathology, especially cancer, as well as protecting against potentially harmful/life threatening disease. Patient and/or guardian verbalizes understanding and agrees with above counseling, assessment and plan. All questions answered. Please note this report has been partially produced using speech recognition software  and may contain errors related to that system including grammar, punctuation and spelling as well as words and phrases that may seem inappropriate. If there are questions or concerns please feel free to contact me to clarify.

## 2021-09-29 NOTE — PATIENT INSTRUCTIONS
Patient Education        Low Sodium Diet (2,000 Milligram): Care Instructions  Overview     Limiting sodium can be an important part of managing some health problems. The most common source of sodium is salt. People get most of the salt in their diet from canned, prepared, and packaged foods. Fast food and restaurant meals also are very high in sodium. Your doctor will probably limit your sodium to less than 2,000 milligrams (mg) a day. This limit counts all the sodium in prepared and packaged foods and any salt you add to your food. Follow-up care is a key part of your treatment and safety. Be sure to make and go to all appointments, and call your doctor if you are having problems. It's also a good idea to know your test results and keep a list of the medicines you take. How can you care for yourself at home? Read food labels  · Read labels on cans and food packages. The labels tell you how much sodium is in each serving. Make sure that you look at the serving size. If you eat more than the serving size, you have eaten more sodium. · Food labels also tell you the Percent Daily Value for sodium. Choose products with low Percent Daily Values for sodium. · Be aware that sodium can come in forms other than salt, including monosodium glutamate (MSG), sodium citrate, and sodium bicarbonate (baking soda). MSG is often added to Asian food. When you eat out, you can sometimes ask for food without MSG or added salt. Buy low-sodium foods  · Buy foods that are labeled \"unsalted\" (no salt added), \"sodium-free\" (less than 5 mg of sodium per serving), or \"low-sodium\" (140 mg or less of sodium per serving). Foods labeled \"reduced-sodium\" and \"light sodium\" may still have too much sodium. Be sure to read the label to see how much sodium you are getting. · Buy fresh vegetables, or frozen vegetables without added sauces. Buy low-sodium versions of canned vegetables, soups, and other canned goods.   Prepare low-sodium meals  · Cut back on the amount of salt you use in cooking. This will help you adjust to the taste. Do not add salt after cooking. One teaspoon of salt has about 2,300 mg of sodium. · Take the salt shaker off the table. · Flavor your food with garlic, lemon juice, onion, vinegar, herbs, and spices. Do not use soy sauce, lite soy sauce, steak sauce, onion salt, garlic salt, celery salt, or ketchup on your food. · Use low-sodium salad dressings, sauces, and ketchup. Or make your own salad dressings and sauces without adding salt. · Use less salt (or none) when recipes call for it. You can often use half the salt a recipe calls for without losing flavor. Other foods such as rice, pasta, and grains do not need added salt. · Rinse canned vegetables, and cook them in fresh water. This removes some--but not all--of the salt. · Avoid water that is naturally high in sodium or that has been treated with water softeners, which add sodium. If you buy bottled water, read the label and choose a sodium-free brand. Avoid high-sodium foods  · Avoid eating:  ? Smoked, cured, salted, and canned meat, fish, and poultry. ? Ham, frye, hot dogs, and luncheon meats. ? Regular, hard, and processed cheese and regular peanut butter. ? Crackers with salted tops, and other salted snack foods such as pretzels, chips, and salted popcorn. ? Frozen prepared meals, unless labeled low-sodium. ? Canned and dried soups, broths, and bouillon, unless labeled sodium-free or low-sodium. ? Canned vegetables, unless labeled sodium-free or low-sodium. ? [place] fries, pizza, tacos, and other fast foods. ? Pickles, olives, ketchup, and other condiments, especially soy sauce, unless labeled sodium-free or low-sodium. Where can you learn more? Go to https://pat.[employer] org and sign in to your [employer] account.  Enter O580 in the [facility] box to learn more about \"Low Sodium Diet (2,000 Milligram): Care Instructions. \"     If you do not have an account, please click on the \"Sign Up Now\" link. Current as of: December 17, 2020               Content Version: 13.0  © 2006-2021 Healthwise, Incorporated. Care instructions adapted under license by [facility] ([facility]). If you have questions about a medical condition or this instruction, always ask your healthcare professional. [address] any warranty or liability for your use of this information. Patient Education        Learning About Carbohydrate (Carb) Counting and Eating Out When You Have Diabetes  Why plan your meals? Meal planning can be a key part of managing diabetes. Planning meals and snacks with the right balance of carbohydrate, protein, and fat can help you keep your blood sugar at the target level you set with your doctor. You don't have to eat special foods. You can eat what your family eats, including sweets once in a while. But you do have to pay attention to how often you eat and how much you eat of certain foods. You may want to work with a dietitian or a certified diabetes educator. He or she can give you tips and meal ideas and can answer your questions about meal planning. This health professional can also help you reach a healthy weight if that is one of your goals. What should you know about eating carbs? Managing the amount of carbohydrate (carbs) you eat is an important part of healthy meals when you have diabetes. Carbohydrate is found in many foods. · Learn which foods have carbs. And learn the amounts of carbs in different foods. ? Bread, cereal, pasta, and rice have about 15 grams of carbs in a serving. A serving is 1 slice of bread (1 ounce), ½ cup of cooked cereal, or 1/3 cup of cooked pasta or rice. ? Fruits have 15 grams of carbs in a serving.  A serving is 1 small fresh fruit, such as an apple or orange; ½ of a banana; ½ cup of cooked or canned fruit; ½ cup of fruit juice; 1 cup of melon or raspberries; or 2 tablespoons of dried fruit. ? Milk and no-sugar-added yogurt have 15 grams of carbs in a serving. A serving is 1 cup of milk or 2/3 cup of no-sugar-added yogurt. ? Starchy vegetables have 15 grams of carbs in a serving. A serving is ½ cup of mashed potatoes or sweet potato; 1 cup winter squash; ½ of a small baked potato; ½ cup of cooked beans; or ½ cup cooked corn or green peas. · Learn how much carbs to eat each day and at each meal. A dietitian or CDE can teach you how to keep track of the amount of carbs you eat. This is called carbohydrate counting. · If you are not sure how to count carbohydrate grams, use the Plate Method to plan meals. It is a good, quick way to make sure that you have a balanced meal. It also helps you spread carbs throughout the day. ? Divide your plate by types of foods. Put non-starchy vegetables on half the plate, meat or other protein food on one-quarter of the plate, and a grain or starchy vegetable in the final quarter of the plate. To this you can add a small piece of fruit and 1 cup of milk or yogurt, depending on how many carbs you are supposed to eat at a meal.  · Try to eat about the same amount of carbs at each meal. Do not \"save up\" your daily allowance of carbs to eat at one meal.  · Proteins have very little or no carbs per serving. Examples of proteins are beef, chicken, turkey, fish, eggs, tofu, cheese, cottage cheese, and peanut butter. A serving size of meat is 3 ounces, which is about the size of a deck of cards. Examples of meat substitute serving sizes (equal to 1 ounce of meat) are 1/4 cup of cottage cheese, 1 egg, 1 tablespoon of peanut butter, and ½ cup of tofu. How can you eat out and still eat healthy? · Learn to estimate the serving sizes of foods that have carbohydrate. If you measure food at home, it will be easier to estimate the amount in a serving of restaurant food.   · If the meal you order has too much carbohydrate (such as potatoes, corn, or baked beans), ask to have a low-carbohydrate food instead. Ask for a salad or green vegetables. · If you use insulin, check your blood sugar before and after eating out to help you plan how much to eat in the future. · If you eat more carbohydrate at a meal than you had planned, take a walk or do other exercise. This will help lower your blood sugar. What are some tips for eating healthy? · Limit saturated fat, such as the fat from meat and dairy products. This is a healthy choice because people who have diabetes are at higher risk of heart disease. So choose lean cuts of meat and nonfat or low-fat dairy products. Use olive or canola oil instead of butter or shortening when cooking. · Don't skip meals. Your blood sugar may drop too low if you skip meals and take insulin or certain medicines for diabetes. · Check with your doctor before you drink alcohol. Alcohol can cause your blood sugar to drop too low. Alcohol can also cause a bad reaction if you take certain diabetes medicines. Follow-up care is a key part of your treatment and safety. Be sure to make and go to all appointments, and call your doctor if you are having problems. It's also a good idea to know your test results and keep a list of the medicines you take. Where can you learn more? Go to https://[employer] org and sign in to your [employer] account. Enter P654 in the [facility] box to learn more about \"Learning About Carbohydrate (Carb) Counting and Eating Out When You Have Diabetes. \"     If you do not have an account, please click on the \"Sign Up Now\" link. Current as of: December 17, 2020               Content Version: 13.0  © 8111-1729 Healthwise, Incorporated. Care instructions adapted under license by [facility] ([facility]).  If you have questions about a medical condition or this instruction, always ask your healthcare professional. [address]  any warranty or liability for your use of this

## 2021-10-06 RX ORDER — PRAVASTATIN SODIUM 40 MG
40 TABLET ORAL DAILY
Qty: 30 TABLET | Refills: 1 | Status: SHIPPED
Start: 2021-10-06 | End: 2022-07-25

## 2022-05-16 ENCOUNTER — OFFICE VISIT (OUTPATIENT)
Dept: FAMILY MEDICINE CLINIC | Age: 67
End: 2022-05-16
Payer: COMMERCIAL

## 2022-05-16 VITALS
BODY MASS INDEX: 27.2 KG/M2 | HEART RATE: 74 BPM | SYSTOLIC BLOOD PRESSURE: 156 MMHG | DIASTOLIC BLOOD PRESSURE: 92 MMHG | RESPIRATION RATE: 17 BRPM | OXYGEN SATURATION: 98 % | TEMPERATURE: 97.4 F | WEIGHT: 190 LBS | HEIGHT: 70 IN

## 2022-05-16 DIAGNOSIS — J40 SINOBRONCHITIS: Primary | ICD-10-CM

## 2022-05-16 DIAGNOSIS — J32.9 SINOBRONCHITIS: Primary | ICD-10-CM

## 2022-05-16 LAB
Lab: NORMAL
PERFORMING INSTRUMENT: NORMAL
QC PASS/FAIL: NORMAL
SARS-COV-2, POC: NORMAL

## 2022-05-16 PROCEDURE — 4040F PNEUMOC VAC/ADMIN/RCVD: CPT | Performed by: NURSE PRACTITIONER

## 2022-05-16 PROCEDURE — 99213 OFFICE O/P EST LOW 20 MIN: CPT | Performed by: NURSE PRACTITIONER

## 2022-05-16 PROCEDURE — 1036F TOBACCO NON-USER: CPT | Performed by: NURSE PRACTITIONER

## 2022-05-16 PROCEDURE — 3017F COLORECTAL CA SCREEN DOC REV: CPT | Performed by: NURSE PRACTITIONER

## 2022-05-16 PROCEDURE — 87426 SARSCOV CORONAVIRUS AG IA: CPT | Performed by: NURSE PRACTITIONER

## 2022-05-16 PROCEDURE — G8417 CALC BMI ABV UP PARAM F/U: HCPCS | Performed by: NURSE PRACTITIONER

## 2022-05-16 PROCEDURE — G8427 DOCREV CUR MEDS BY ELIG CLIN: HCPCS | Performed by: NURSE PRACTITIONER

## 2022-05-16 PROCEDURE — 1123F ACP DISCUSS/DSCN MKR DOCD: CPT | Performed by: NURSE PRACTITIONER

## 2022-05-16 RX ORDER — BROMPHENIRAMINE MALEATE, PSEUDOEPHEDRINE HYDROCHLORIDE, AND DEXTROMETHORPHAN HYDROBROMIDE 2; 30; 10 MG/5ML; MG/5ML; MG/5ML
10 SYRUP ORAL 4 TIMES DAILY PRN
Qty: 120 ML | Refills: 0 | Status: SHIPPED
Start: 2022-05-16 | End: 2022-07-25

## 2022-05-16 RX ORDER — AZITHROMYCIN 250 MG/1
250 TABLET, FILM COATED ORAL SEE ADMIN INSTRUCTIONS
Qty: 6 TABLET | Refills: 0 | Status: SHIPPED | OUTPATIENT
Start: 2022-05-16 | End: 2022-05-21

## 2022-05-16 SDOH — ECONOMIC STABILITY: FOOD INSECURITY: WITHIN THE PAST 12 MONTHS, YOU WORRIED THAT YOUR FOOD WOULD RUN OUT BEFORE YOU GOT MONEY TO BUY MORE.: NEVER TRUE

## 2022-05-16 SDOH — ECONOMIC STABILITY: FOOD INSECURITY: WITHIN THE PAST 12 MONTHS, THE FOOD YOU BOUGHT JUST DIDN'T LAST AND YOU DIDN'T HAVE MONEY TO GET MORE.: NEVER TRUE

## 2022-05-16 ASSESSMENT — SOCIAL DETERMINANTS OF HEALTH (SDOH): HOW HARD IS IT FOR YOU TO PAY FOR THE VERY BASICS LIKE FOOD, HOUSING, MEDICAL CARE, AND HEATING?: NOT HARD AT ALL

## 2022-05-16 NOTE — PROGRESS NOTES
Chief Complaint       Sinus Problem (sinus congestion and drainage, both ears are full , started saturday )    History of Present Illness   Source of history provided by:  patient. Kelsy Oseguera is a 73 y.o. old male presenting to the walk in clinic for evaluation of above symptoms, for x 3 days. Denies any diarrhea, nausea CP, dyspnea, LE edema, abdominal pain, vomiting, rash, or lethargy. Denies hx of asthma or COPD; denies tobacco use. Patient denies recent sick exposures. Patient has not been vaccinated for COVID-19, has been hospitalized for COVID last year. Patient has been taking OTC for symptomatic relief. Able to eat & drink. ROS    Unless otherwise stated in this report or unable to obtain because of the patient's clinical or mental status as evidenced by the medical record, this patients's positive and negative responses for Review of Systems, constitutional, psych, eyes, ENT, cardiovascular, respiratory, gastrointestinal, neurological, genitourinary, musculoskeletal, integument systems and systems related to the presenting problem are either stated in the preceding or were not pertinent or were negative for the symptoms and/or complaints related to the medical problem. Past Medical History:  has a past medical history of Enlarged prostate. Past Surgical History:  has no past surgical history on file. Social History:  reports that he has never smoked. He has never used smokeless tobacco. He reports that he does not drink alcohol and does not use drugs. Family History: family history includes Alzheimer's Disease in his father; Cancer in his mother; High Cholesterol in his father. Allergies: Patient has no known allergies.     Physical Exam         VS:  BP (!) 156/92   Pulse 74   Temp 97.4 °F (36.3 °C) (Temporal)   Resp 17   Ht 5' 10\" (1.778 m)   Wt 190 lb (86.2 kg)   SpO2 98%   BMI 27.26 kg/m²    Oxygen Saturation Interpretation: Normal.    Constitutional:  Alert, development consistent with age. NAD. Head:  NC/NT. Airway patent. Cerumen noted. Mouth: Posterior pharynx with mild erythema and clear postnasal drip. No tonsillar hypertrophy or exudate. Neck:  Normal ROM. Supple. No anterior cervical adenopathy noted. Lungs: CTAB without wheezes, rales, or rhonchi. CV:  Regular rate and rhythm, normal heart sounds, without pathological murmurs, ectopy, gallops, or rubs. Skin:  Normal turgor. Warm, dry, without visible rash. Lymphatic: No lymphangitis or adenopathy noted. Neurological:  Oriented. Motor functions intact. Lab / Imaging Results   (All laboratory and radiology results have been personally reviewed by myself)  Labs:  No results found for this visit on 05/16/22. Imaging: All Radiology results interpreted by Radiologist unless otherwise noted. No results found for this visit on 05/16/22. Assessment / Plan     Impression(s):  Brooklynn Quezada was seen today for sinus problem. Diagnoses and all orders for this visit:    Sinobronchitis  -     azithromycin (ZITHROMAX) 250 MG tablet; Take 1 tablet by mouth See Admin Instructions for 5 days 500mg on day 1 followed by 250mg on days 2 - 5  -     brompheniramine-pseudoephedrine-DM (BROMFED DM) 2-30-10 MG/5ML syrup; Take 10 mLs by mouth 4 times daily as needed for Congestion or Cough  -     POCT COVID-19, Antigen      - Red Flag items & conservative methods discussed including OTC methods & Coricidin medication  - F/u with PCP if symptoms persist    Disposition:  Disposition: Discharge to home. Advised cautionary self-quarantine at home in the interim. Increase fluids and rest. Symptomatic relief discussed including Tylenol prn pain/fever. Schedule virtual f/u with PCP in 7-10 days if symptoms persist. ED sooner if symptoms worsen or change.  ED immediately with high or refractory fever, progressive SOB, dyspnea, CP, calf pain/swelling, shaking chills, vomiting, abdominal pain, lethargy, flank pain, or decreased urinary output. Pt verbalizes understanding and is in agreement with plan of care. All questions answered. Mireya Moulton, APRN - CNP    **This report was transcribed using voice recognition software. Every effort was made to ensure accuracy; however, inadvertent computerized transcription errors may be present.

## 2022-07-25 ENCOUNTER — OFFICE VISIT (OUTPATIENT)
Dept: FAMILY MEDICINE CLINIC | Age: 67
End: 2022-07-25
Payer: COMMERCIAL

## 2022-07-25 VITALS
RESPIRATION RATE: 20 BRPM | SYSTOLIC BLOOD PRESSURE: 129 MMHG | DIASTOLIC BLOOD PRESSURE: 78 MMHG | WEIGHT: 195 LBS | HEART RATE: 69 BPM | HEIGHT: 71 IN | BODY MASS INDEX: 27.3 KG/M2 | TEMPERATURE: 97.1 F | OXYGEN SATURATION: 97 %

## 2022-07-25 DIAGNOSIS — R73.9 HYPERGLYCEMIA: ICD-10-CM

## 2022-07-25 DIAGNOSIS — R39.14 BENIGN PROSTATIC HYPERPLASIA WITH INCOMPLETE BLADDER EMPTYING: ICD-10-CM

## 2022-07-25 DIAGNOSIS — Z13.29 SCREENING FOR THYROID DISORDER: ICD-10-CM

## 2022-07-25 DIAGNOSIS — G57.91 NEUROPATHY OF RIGHT LOWER EXTREMITY: ICD-10-CM

## 2022-07-25 DIAGNOSIS — Z86.16 HISTORY OF COVID-19: ICD-10-CM

## 2022-07-25 DIAGNOSIS — Z76.89 ENCOUNTER TO ESTABLISH CARE: Primary | ICD-10-CM

## 2022-07-25 DIAGNOSIS — Z13.21 ENCOUNTER FOR VITAMIN DEFICIENCY SCREENING: ICD-10-CM

## 2022-07-25 DIAGNOSIS — N40.1 BENIGN PROSTATIC HYPERPLASIA WITH INCOMPLETE BLADDER EMPTYING: ICD-10-CM

## 2022-07-25 PROCEDURE — 1123F ACP DISCUSS/DSCN MKR DOCD: CPT | Performed by: NEUROMUSCULOSKELETAL MEDICINE & OMM

## 2022-07-25 PROCEDURE — 99203 OFFICE O/P NEW LOW 30 MIN: CPT | Performed by: NEUROMUSCULOSKELETAL MEDICINE & OMM

## 2022-07-25 ASSESSMENT — PATIENT HEALTH QUESTIONNAIRE - PHQ9
1. LITTLE INTEREST OR PLEASURE IN DOING THINGS: 0
SUM OF ALL RESPONSES TO PHQ9 QUESTIONS 1 & 2: 0
SUM OF ALL RESPONSES TO PHQ QUESTIONS 1-9: 0
2. FEELING DOWN, DEPRESSED OR HOPELESS: 0
SUM OF ALL RESPONSES TO PHQ QUESTIONS 1-9: 0

## 2022-07-25 ASSESSMENT — ENCOUNTER SYMPTOMS
SHORTNESS OF BREATH: 0
CHOKING: 0
COUGH: 0
CHEST TIGHTNESS: 0

## 2022-07-25 NOTE — ASSESSMENT & PLAN NOTE
Patient is followed by Dr. Milton Fortune, neurologist. He is compliant with his Flomax 0.4 mg daily. He is entertaining the thought of a UroLift procedure.

## 2022-07-25 NOTE — PROGRESS NOTES
Eileen Winter (:  1955) is a 78 y.o. male,New patient, here for evaluation of the following chief complaint(s):  Establish Care         ASSESSMENT/PLAN:  1. Encounter to establish care  2. Neuropathy of right lower extremity  Assessment & Plan:  I will obtain a EMG of his right lower extremity. Based on those findings a possibility is occupational therapy for his decreased range of motion and neuropathy symptoms. I believe this is all a result of his lower back injury in his early to mid 25s. He states that he suffered a herniated disc at that time he could not recall at what level. Orders:  -     CBC with Auto Differential; Future  -     EMG; Future  3. Benign prostatic hyperplasia with incomplete bladder emptying  Assessment & Plan:  Patient is followed by Dr. Baltazar Obando, neurologist. He is compliant with his Flomax 0.4 mg daily. He is entertaining the thought of a UroLift procedure. 4. History of COVID-19  Assessment & Plan:  History of hospitalization for 1 week at [address] [facility]. He had elevated blood sugars requiring insulin therapy while hospitalized. He has had no treatment for his elevated sugars since being released from the hospital.  We will check fasting blood work which will include a CMP, and hemoglobin A1c to evaluate blood sugar status. 5. Hyperglycemia  -     Comprehensive Metabolic Panel; Future  -     Hemoglobin A1C; Future  -     Lipid Panel; Future  6. Screening for thyroid disorder  -     TSH; Future  7. Encounter for vitamin deficiency screening  -     Vitamin D 25 Hydroxy; Future      Return in about 1 month (around 2022). Subjective   SUBJECTIVE/OBJECTIVE:  HPI 52-year-old male here to establish care. Patient has a history of BPH which he sees Dr. Tod Ruelas Daniele. He currently takes Flomax 0.4 mg daily for this condition. Patient's had a longstanding chronic right foot numbness and tingling.   He relates this to a lower back herniated disc injury in his early to mid 25s. He did receive some injections at a physical therapist office many years ago. He has no pain in his lower back he does have some decreased range of motion with flexion and extension of his right foot. He is concerned because sometimes he does stumble, trip and fall on occasion. Advised that we get an EMG and possible Occupational Therapy evaluation based on what the EMG shows. Patient is in agreement. Reviewed patient's last blood work in September, 2021 and will get some fasting blood work as well as an hemoglobin A1c. Patient last July or August was hospitalized with COVID for 1 week, during that visit he had to have insulin to control his blood sugars. He does not feel he is a diabetic now and wanted that checked through blood work as well. Review of Systems   Constitutional:  Negative for activity change, appetite change and unexpected weight change. Respiratory:  Negative for cough, choking, chest tightness and shortness of breath. Cardiovascular:  Negative for chest pain and palpitations. Neurological:  Positive for numbness (Numbness and tingling of right foot/lower extremity times many years. ). Objective   /78 (Site: Left Upper Arm, Position: Sitting, Cuff Size: Large Adult)   Pulse 69   Temp 97.1 °F (36.2 °C) (Temporal)   Resp 20   Ht 5' 10.5\" (1.791 m)   Wt 195 lb (88.5 kg)   SpO2 97%   BMI 27.58 kg/m²     Physical Exam  Vitals and nursing note reviewed. Constitutional:       Appearance: Normal appearance. HENT:      Head: Normocephalic and atraumatic. Cardiovascular:      Rate and Rhythm: Normal rate and regular rhythm. Pulses: Normal pulses. Dorsalis pedis pulses are 2+ on the left side. Posterior tibial pulses are 2+ on the left side. Heart sounds: Normal heart sounds. No murmur heard. No gallop. Pulmonary:      Effort: Pulmonary effort is normal. No respiratory distress. Breath sounds: Normal breath sounds.  No wheezing, rhonchi or rales. Musculoskeletal:      Left foot: Decreased range of motion. No deformity, bunion, Charcot foot, foot drop or prominent metatarsal heads. Feet:      Left foot:      Protective Sensation: 6 sites tested. 6 sites sensed. Skin integrity: Skin integrity normal. No ulcer, blister, skin breakdown, erythema or warmth. Skin:     General: Skin is warm and dry. Neurological:      Mental Status: He is alert and oriented to person, place, and time. Mental status is at baseline. Psychiatric:         Mood and Affect: Mood normal.         Behavior: Behavior normal.           Past Medical History:   Diagnosis Date    Enlarged prostate         History reviewed. No pertinent surgical history. The 10-year ASCVD risk score (Trudy Richey, et al., 2013) is: 40.4%    Values used to calculate the score:      Age: 78 years      Sex: Male      Is Non- : No      Diabetic: Yes      Tobacco smoker: No      Systolic Blood Pressure: 059 mmHg      Is BP treated: No      HDL Cholesterol: 29 mg/dL      Total Cholesterol: 296 mg/dL     Educational materials and/or home exercises printed for patient's review and were included inpatient instructions on his/her After Visit Summary and given to patient at the end of visit.     regarding above diagnosis, including possible risks and complications,  especially if left uncontrolled. Counseled regarding the possible side effects, risks, benefits and alternatives to treatment; patient and/or guardian verbalizes understanding, agrees, feels comfortable with and wishes to proceed withabove treatment plan. Advised patient to call with any new medication issues, and read all Rx infofrom pharmacy to assure aware of all possible risks and side effects of medication before taking. age and gender appropriate health screening exams and vaccinations.   Advised patient regarding importance of keeping up with recommended health maintenance and to schedule as soon as possible if overdue, as this isimportant in assessing for undiagnosed pathology, especially cancer, as well as protecting against potentially harmful/life threatening disease. Patient and/or guardian verbalizes understanding andagrees with above counseling, assessment and plan. All questions answered. An electronic signature was used to authenticate this note.     --Carol Ann Abreu, DO

## 2022-07-25 NOTE — ASSESSMENT & PLAN NOTE
I will obtain a EMG of his right lower extremity. Based on those findings a possibility is occupational therapy for his decreased range of motion and neuropathy symptoms. I believe this is all a result of his lower back injury in his early to mid 25s. He states that he suffered a herniated disc at that time he could not recall at what level.

## 2022-07-25 NOTE — ASSESSMENT & PLAN NOTE
History of hospitalization for 1 week at [address] [facility]. He had elevated blood sugars requiring insulin therapy while hospitalized. He has had no treatment for his elevated sugars since being released from the hospital.  We will check fasting blood work which will include a CMP, and hemoglobin A1c to evaluate blood sugar status.

## 2022-09-19 ENCOUNTER — OFFICE VISIT (OUTPATIENT)
Dept: FAMILY MEDICINE CLINIC | Age: 67
End: 2022-09-19
Payer: COMMERCIAL

## 2022-09-19 VITALS
BODY MASS INDEX: 27.61 KG/M2 | WEIGHT: 197.2 LBS | RESPIRATION RATE: 16 BRPM | SYSTOLIC BLOOD PRESSURE: 122 MMHG | DIASTOLIC BLOOD PRESSURE: 74 MMHG | HEIGHT: 71 IN | HEART RATE: 68 BPM | TEMPERATURE: 97.4 F | OXYGEN SATURATION: 97 %

## 2022-09-19 DIAGNOSIS — G57.91 NEUROPATHY OF RIGHT LOWER EXTREMITY: ICD-10-CM

## 2022-09-19 DIAGNOSIS — M21.371 FOOT DROP, RIGHT: Primary | ICD-10-CM

## 2022-09-19 DIAGNOSIS — N40.1 BENIGN PROSTATIC HYPERPLASIA WITH INCOMPLETE BLADDER EMPTYING: ICD-10-CM

## 2022-09-19 DIAGNOSIS — R39.14 BENIGN PROSTATIC HYPERPLASIA WITH INCOMPLETE BLADDER EMPTYING: ICD-10-CM

## 2022-09-19 PROCEDURE — G8427 DOCREV CUR MEDS BY ELIG CLIN: HCPCS | Performed by: FAMILY MEDICINE

## 2022-09-19 PROCEDURE — G8417 CALC BMI ABV UP PARAM F/U: HCPCS | Performed by: FAMILY MEDICINE

## 2022-09-19 PROCEDURE — 3017F COLORECTAL CA SCREEN DOC REV: CPT | Performed by: FAMILY MEDICINE

## 2022-09-19 PROCEDURE — 1123F ACP DISCUSS/DSCN MKR DOCD: CPT | Performed by: FAMILY MEDICINE

## 2022-09-19 PROCEDURE — 99214 OFFICE O/P EST MOD 30 MIN: CPT | Performed by: FAMILY MEDICINE

## 2022-09-19 PROCEDURE — 1036F TOBACCO NON-USER: CPT | Performed by: FAMILY MEDICINE

## 2022-09-19 ASSESSMENT — ENCOUNTER SYMPTOMS
SHORTNESS OF BREATH: 0
RHINORRHEA: 0
SINUS PAIN: 0
COUGH: 0
EYE REDNESS: 0
PHOTOPHOBIA: 0
EYE DISCHARGE: 0
GASTROINTESTINAL NEGATIVE: 1

## 2022-09-19 NOTE — PROGRESS NOTES
2022    [facility]    Chief Complaint   Patient presents with    New Patient       HPI  History was obtained from patient. Robert is a 78 y.o. male who presents today with the followin. Foot drop, right    2. Neuropathy of right lower extremity    3. Benign prostatic hyperplasia with incomplete bladder emptying    Patient presents today to establish primary care. Patient has a history of BPH and is followed by Dr. Leopoldo Fletcher on a regular basis. Patient's chief complaint today is right foot drop. Patient states he has been dealing with this for over 20 years and he has noticed lately it is becoming more problematic. He did have a EMG ordered 2 months ago but he never scheduled. Patient states now that he is finding himself tripping and has had falls secondary to his foot drop. Patient states that he has a lumbar disc problem when in his 25s and thinks that is what is causing his problem now. Patient denies any back pain at this time. He has no pain down his leg. Sensation appears to be grossly intact. REVIEW OF SYMPTOMS    Review of Systems   Constitutional:  Negative for chills, fatigue and fever. HENT:  Negative for congestion, mouth sores, postnasal drip, rhinorrhea and sinus pain. Eyes:  Negative for photophobia, discharge and redness. Respiratory:  Negative for cough and shortness of breath. Cardiovascular:  Negative for chest pain. Gastrointestinal: Negative. Genitourinary:  Negative for difficulty urinating, dysuria, hematuria and urgency. Neurological:  Negative for headaches. Psychiatric/Behavioral:  Negative for sleep disturbance.       PAST MEDICAL HISTORY  Past Medical History:   Diagnosis Date    Enlarged prostate        FAMILY HISTORY  Family History   Problem Relation Age of Onset    Cancer Mother         cervical    High Cholesterol Father     Alzheimer's Disease Father        SOCIAL HISTORY  Social History     Socioeconomic History    Marital status:  General: Skin is warm. Neurological:      Mental Status: He is alert and oriented to person, place, and time. Comments: Patient does have weakness with dorsiflexion of the right foot. Sensation appears to be grossly intact. He has normal motion and muscle strength of the left foot. Psychiatric:         Mood and Affect: Mood normal.       ASSESSMENT & PLAN    Jonelle Rees was seen today for new patient. Diagnoses and all orders for this visit:    Foot drop, right    Neuropathy of right lower extremity  Patient is to have scheduled his EMG of the right lower extremity. He is to follow-up in the office after he gets his procedure done and we will determine a plan at that time. Patient would probably benefit from physical therapy and possibly an AFO of some type. Benign prostatic hyperplasia with incomplete bladder emptying  Patient will continue to follow with Dr. Selena Hammond in about 4 weeks (around 10/17/2022). Patient did have labs done recently but they were drawn at [facility] and we do not have those results. Patient will bring them with him at his next appointment. Electronically signed by Gerhard Reyez DO on 9/19/2022

## 2022-09-20 ENCOUNTER — TELEPHONE (OUTPATIENT)
Dept: FAMILY MEDICINE CLINIC | Age: 67
End: 2022-09-20

## 2022-09-20 NOTE — TELEPHONE ENCOUNTER
Sheryle Kelly [phone] needs a diagnosis for the actual Vit D deficiency.   Please call her with that diagnosis

## 2022-09-23 ENCOUNTER — HOSPITAL ENCOUNTER (OUTPATIENT)
Age: 67
Discharge: HOME | End: 2022-09-23
Payer: COMMERCIAL

## 2022-09-23 LAB — PROSTATE SPECIFIC ANTIGEN: 7.18 NG/ML (ref 0–4)

## 2022-09-23 PROCEDURE — 84153 ASSAY OF PSA TOTAL: CPT

## 2022-09-23 PROCEDURE — 36415 COLL VENOUS BLD VENIPUNCTURE: CPT

## 2023-02-28 ENCOUNTER — HOSPITAL ENCOUNTER (OUTPATIENT)
Age: 68
Discharge: HOME | End: 2023-02-28
Payer: COMMERCIAL

## 2023-02-28 LAB — PROSTATE SPECIFIC ANTIGEN: 9.64 NG/ML (ref 0–4)

## 2023-02-28 PROCEDURE — 84153 ASSAY OF PSA TOTAL: CPT

## 2023-02-28 PROCEDURE — 36415 COLL VENOUS BLD VENIPUNCTURE: CPT

## 2023-08-10 ENCOUNTER — HOSPITAL ENCOUNTER (OUTPATIENT)
Age: 68
Discharge: HOME | End: 2023-08-12

## 2023-08-15 LAB — SURGICAL PATHOLOGY REPORT: NORMAL

## 2023-11-27 ENCOUNTER — OFFICE VISIT (OUTPATIENT)
Dept: FAMILY MEDICINE CLINIC | Age: 68
End: 2023-11-27
Payer: COMMERCIAL

## 2023-11-27 VITALS
HEIGHT: 71 IN | TEMPERATURE: 97.2 F | RESPIRATION RATE: 18 BRPM | DIASTOLIC BLOOD PRESSURE: 76 MMHG | BODY MASS INDEX: 27.41 KG/M2 | WEIGHT: 195.8 LBS | SYSTOLIC BLOOD PRESSURE: 128 MMHG | HEART RATE: 68 BPM | OXYGEN SATURATION: 98 %

## 2023-11-27 DIAGNOSIS — G57.91 NEUROPATHY OF RIGHT LOWER EXTREMITY: ICD-10-CM

## 2023-11-27 DIAGNOSIS — E78.5 HYPERLIPIDEMIA, UNSPECIFIED HYPERLIPIDEMIA TYPE: ICD-10-CM

## 2023-11-27 DIAGNOSIS — R73.03 PREDIABETES: ICD-10-CM

## 2023-11-27 DIAGNOSIS — E55.9 VITAMIN D DEFICIENCY: ICD-10-CM

## 2023-11-27 DIAGNOSIS — R39.14 BENIGN PROSTATIC HYPERPLASIA WITH INCOMPLETE BLADDER EMPTYING: ICD-10-CM

## 2023-11-27 DIAGNOSIS — R73.9 HYPERGLYCEMIA: Primary | ICD-10-CM

## 2023-11-27 DIAGNOSIS — M21.371 FOOT DROP, RIGHT: ICD-10-CM

## 2023-11-27 DIAGNOSIS — N40.1 BENIGN PROSTATIC HYPERPLASIA WITH INCOMPLETE BLADDER EMPTYING: ICD-10-CM

## 2023-11-27 LAB — HBA1C MFR BLD: 6.4 %

## 2023-11-27 PROCEDURE — G8427 DOCREV CUR MEDS BY ELIG CLIN: HCPCS | Performed by: FAMILY MEDICINE

## 2023-11-27 PROCEDURE — 1123F ACP DISCUSS/DSCN MKR DOCD: CPT | Performed by: FAMILY MEDICINE

## 2023-11-27 PROCEDURE — G8419 CALC BMI OUT NRM PARAM NOF/U: HCPCS | Performed by: FAMILY MEDICINE

## 2023-11-27 PROCEDURE — G8484 FLU IMMUNIZE NO ADMIN: HCPCS | Performed by: FAMILY MEDICINE

## 2023-11-27 PROCEDURE — 1036F TOBACCO NON-USER: CPT | Performed by: FAMILY MEDICINE

## 2023-11-27 PROCEDURE — 3017F COLORECTAL CA SCREEN DOC REV: CPT | Performed by: FAMILY MEDICINE

## 2023-11-27 PROCEDURE — 99214 OFFICE O/P EST MOD 30 MIN: CPT | Performed by: FAMILY MEDICINE

## 2023-11-27 PROCEDURE — 83036 HEMOGLOBIN GLYCOSYLATED A1C: CPT | Performed by: FAMILY MEDICINE

## 2023-11-27 SDOH — ECONOMIC STABILITY: HOUSING INSECURITY
IN THE LAST 12 MONTHS, WAS THERE A TIME WHEN YOU DID NOT HAVE A STEADY PLACE TO SLEEP OR SLEPT IN A SHELTER (INCLUDING NOW)?: NO

## 2023-11-27 SDOH — ECONOMIC STABILITY: INCOME INSECURITY: HOW HARD IS IT FOR YOU TO PAY FOR THE VERY BASICS LIKE FOOD, HOUSING, MEDICAL CARE, AND HEATING?: NOT HARD AT ALL

## 2023-11-27 SDOH — ECONOMIC STABILITY: FOOD INSECURITY: WITHIN THE PAST 12 MONTHS, YOU WORRIED THAT YOUR FOOD WOULD RUN OUT BEFORE YOU GOT MONEY TO BUY MORE.: NEVER TRUE

## 2023-11-27 SDOH — ECONOMIC STABILITY: FOOD INSECURITY: WITHIN THE PAST 12 MONTHS, THE FOOD YOU BOUGHT JUST DIDN'T LAST AND YOU DIDN'T HAVE MONEY TO GET MORE.: NEVER TRUE

## 2023-11-27 ASSESSMENT — PATIENT HEALTH QUESTIONNAIRE - PHQ9
SUM OF ALL RESPONSES TO PHQ QUESTIONS 1-9: 0
SUM OF ALL RESPONSES TO PHQ QUESTIONS 1-9: 0
2. FEELING DOWN, DEPRESSED OR HOPELESS: 0
SUM OF ALL RESPONSES TO PHQ QUESTIONS 1-9: 0
SUM OF ALL RESPONSES TO PHQ QUESTIONS 1-9: 0
SUM OF ALL RESPONSES TO PHQ9 QUESTIONS 1 & 2: 0
1. LITTLE INTEREST OR PLEASURE IN DOING THINGS: 0

## 2023-11-27 NOTE — PROGRESS NOTES
2023    Sosa Granger    Chief Complaint   Patient presents with    Annual Exam     Wants labs ordered         HPI  History was obtained from patient. Tej Streeter is a 80 y.o. male who presents today with the followin. Hyperglycemia    2. Benign prostatic hyperplasia with incomplete bladder emptying    3. Neuropathy of right lower extremity    4. Foot drop, right    5. Prediabetes    6. Vitamin D deficiency    7. Hyperlipidemia, unspecified hyperlipidemia type    Patient presents for annual exam.  Patient has a history of prediabetes vitamin D deficiency and neuropathy of the right lower extremity. Patient had EMG ordered a year and a half ago but he never got done. He would like to try and reschedule that. Patient would like to see someone from possibly a prosthetic. REVIEW OF SYMPTOMS    Review of Systems   Constitutional:  Negative for chills, fatigue and fever. HENT:  Negative for congestion, mouth sores, postnasal drip, rhinorrhea and sinus pain. Eyes:  Negative for photophobia, discharge and redness. Respiratory:  Negative for cough and shortness of breath. Cardiovascular:  Negative for chest pain. Gastrointestinal: Negative. Genitourinary:  Negative for difficulty urinating, dysuria, hematuria and urgency. Neurological:  Negative for headaches. Psychiatric/Behavioral:  Negative for sleep disturbance.         PAST MEDICAL HISTORY  Past Medical History:   Diagnosis Date    Enlarged prostate        FAMILY HISTORY  Family History   Problem Relation Age of Onset    Cancer Mother         cervical    High Cholesterol Father     Alzheimer's Disease Father        SOCIAL HISTORY  Social History     Socioeconomic History    Marital status:      Spouse name: None    Number of children: 3    Years of education: None    Highest education level: None   Occupational History    Occupation:    Tobacco Use    Smoking status: Never    Smokeless tobacco: Never   Vaping Use

## 2023-11-30 ENCOUNTER — HOSPITAL ENCOUNTER (OUTPATIENT)
Age: 68
Discharge: HOME | End: 2023-11-30
Payer: COMMERCIAL

## 2023-11-30 DIAGNOSIS — E55.9 VITAMIN D DEFICIENCY: ICD-10-CM

## 2023-11-30 DIAGNOSIS — E78.5 HYPERLIPIDEMIA, UNSPECIFIED HYPERLIPIDEMIA TYPE: ICD-10-CM

## 2023-11-30 LAB
25(OH)D3 SERPL-MCNC: 37.3 NG/ML (ref 30–100)
ALBUMIN SERPL-MCNC: 4.6 G/DL (ref 3.5–5.2)
ALP SERPL-CCNC: 90 U/L (ref 40–129)
ALT SERPL-CCNC: 32 U/L (ref 0–40)
ANION GAP SERPL CALCULATED.3IONS-SCNC: 12 MMOL/L (ref 7–16)
AST SERPL-CCNC: 24 U/L (ref 0–39)
BILIRUB SERPL-MCNC: 0.6 MG/DL (ref 0–1.2)
BUN SERPL-MCNC: 22 MG/DL (ref 6–23)
CALCIUM SERPL-MCNC: 9.2 MG/DL (ref 8.6–10.2)
CHLORIDE SERPL-SCNC: 102 MMOL/L (ref 98–107)
CHOLEST SERPL-MCNC: 286 MG/DL
CO2 SERPL-SCNC: 24 MMOL/L (ref 22–29)
CREAT SERPL-MCNC: 1.2 MG/DL (ref 0.7–1.2)
GFR SERPL CREATININE-BSD FRML MDRD: >60 ML/MIN/1.73M2
GLUCOSE SERPL-MCNC: 140 MG/DL (ref 74–99)
HDLC SERPL-MCNC: 39 MG/DL
LDLC SERPL CALC-MCNC: 228 MG/DL
POTASSIUM SERPL-SCNC: 4.3 MMOL/L (ref 3.5–5)
PROT SERPL-MCNC: 7.5 G/DL (ref 6.4–8.3)
SODIUM SERPL-SCNC: 138 MMOL/L (ref 132–146)
TRIGL SERPL-MCNC: 94 MG/DL
VLDLC SERPL CALC-MCNC: 19 MG/DL

## 2023-11-30 PROCEDURE — 82306 VITAMIN D 25 HYDROXY: CPT

## 2023-11-30 PROCEDURE — 80061 LIPID PANEL: CPT

## 2023-11-30 PROCEDURE — 36415 COLL VENOUS BLD VENIPUNCTURE: CPT

## 2023-11-30 PROCEDURE — 80053 COMPREHEN METABOLIC PANEL: CPT

## 2023-12-02 PROBLEM — R73.03 PREDIABETES: Status: ACTIVE | Noted: 2023-12-02

## 2023-12-02 PROBLEM — E55.9 VITAMIN D DEFICIENCY: Status: ACTIVE | Noted: 2023-12-02

## 2023-12-02 PROBLEM — R73.9 HYPERGLYCEMIA: Status: ACTIVE | Noted: 2023-12-02

## 2023-12-02 PROBLEM — E78.5 HYPERLIPIDEMIA: Status: ACTIVE | Noted: 2023-12-02

## 2023-12-02 PROBLEM — M21.371 FOOT DROP, RIGHT: Status: ACTIVE | Noted: 2023-12-02

## 2023-12-02 ASSESSMENT — ENCOUNTER SYMPTOMS
SHORTNESS OF BREATH: 0
COUGH: 0
GASTROINTESTINAL NEGATIVE: 1
SINUS PAIN: 0
RHINORRHEA: 0
EYE DISCHARGE: 0
PHOTOPHOBIA: 0
EYE REDNESS: 0

## 2023-12-14 ENCOUNTER — HOSPITAL ENCOUNTER (EMERGENCY)
Age: 68
Discharge: HOME | End: 2023-12-14
Attending: STUDENT IN AN ORGANIZED HEALTH CARE EDUCATION/TRAINING PROGRAM
Payer: COMMERCIAL

## 2023-12-14 VITALS
DIASTOLIC BLOOD PRESSURE: 95 MMHG | SYSTOLIC BLOOD PRESSURE: 182 MMHG | BODY MASS INDEX: 26.6 KG/M2 | RESPIRATION RATE: 16 BRPM | TEMPERATURE: 97.7 F | HEIGHT: 71 IN | OXYGEN SATURATION: 97 % | HEART RATE: 62 BPM | WEIGHT: 190 LBS

## 2023-12-14 DIAGNOSIS — T14.8XXA BLEEDING FROM WOUND: Primary | ICD-10-CM

## 2023-12-14 PROCEDURE — 99283 EMERGENCY DEPT VISIT LOW MDM: CPT

## 2023-12-14 PROCEDURE — 2500000003 HC RX 250 WO HCPCS

## 2023-12-14 RX ORDER — TRANEXAMIC ACID 100 MG/ML
INJECTION, SOLUTION INTRAVENOUS
Status: COMPLETED
Start: 2023-12-14 | End: 2023-12-14

## 2023-12-14 RX ORDER — TRANEXAMIC ACID 100 MG/ML
1000 INJECTION, SOLUTION INTRAVENOUS ONCE
Status: COMPLETED | OUTPATIENT
Start: 2023-12-14 | End: 2023-12-14

## 2023-12-14 RX ADMIN — TRANEXAMIC ACID 1000 MG: 100 INJECTION, SOLUTION INTRAVENOUS at 20:04

## 2023-12-14 ASSESSMENT — PAIN - FUNCTIONAL ASSESSMENT: PAIN_FUNCTIONAL_ASSESSMENT: NONE - DENIES PAIN

## 2023-12-26 ENCOUNTER — TELEPHONE (OUTPATIENT)
Dept: FAMILY MEDICINE CLINIC | Age: 68
End: 2023-12-26

## 2023-12-26 NOTE — TELEPHONE ENCOUNTER
Patient called and states he tested positive for Covid today, he has symptoms since Saturday. He is asking for medication to be called into his pharmacy . Please advise. When message has been addressed, please route message to office pool not to original sender.

## 2024-01-08 ENCOUNTER — OFFICE VISIT (OUTPATIENT)
Dept: FAMILY MEDICINE CLINIC | Age: 69
End: 2024-01-08

## 2024-01-08 VITALS
HEIGHT: 71 IN | BODY MASS INDEX: 27.72 KG/M2 | SYSTOLIC BLOOD PRESSURE: 113 MMHG | TEMPERATURE: 98 F | DIASTOLIC BLOOD PRESSURE: 73 MMHG | HEART RATE: 76 BPM | WEIGHT: 198 LBS | OXYGEN SATURATION: 97 %

## 2024-01-08 DIAGNOSIS — E78.5 HYPERLIPIDEMIA, UNSPECIFIED HYPERLIPIDEMIA TYPE: ICD-10-CM

## 2024-01-08 DIAGNOSIS — R73.9 HYPERGLYCEMIA: ICD-10-CM

## 2024-01-08 DIAGNOSIS — E11.9 TYPE 2 DIABETES MELLITUS WITHOUT COMPLICATION, WITHOUT LONG-TERM CURRENT USE OF INSULIN (HCC): Primary | ICD-10-CM

## 2024-01-08 LAB
CREATININE URINE POCT: 300
MICROALBUMIN/CREAT 24H UR: 150 MG/G{CREAT}
MICROALBUMIN/CREAT UR-RTO: NORMAL

## 2024-01-08 RX ORDER — ROSUVASTATIN CALCIUM 20 MG/1
20 TABLET, COATED ORAL DAILY
Qty: 90 TABLET | Refills: 1 | Status: SHIPPED | OUTPATIENT
Start: 2024-01-08

## 2024-01-08 NOTE — PROGRESS NOTES
2024    Nitesh Roman    Chief Complaint   Patient presents with    Hyperglycemia     Follow up        HPI  History was obtained from patient.  Nitesh is a 68 y.o. male who presents today with the followin. Hyperglycemia    2. Hyperlipidemia, unspecified hyperlipidemia type    3. Prediabetes    4. Type 2 diabetes mellitus without complication, without long-term current use of insulin (HCC)    Patient presents for follow-up of hyperglycemia and hyperlipidemia.  Patient is taking his rosuvastatin 20 mg as prescribed.  Patient has no acute complaints today.  Patient's last hemoglobin A1c was 6.42 months ago.  Patient is not on any diabetic medication.    REVIEW OF SYMPTOMS    Review of Systems   Constitutional:  Negative for chills, fatigue and fever.   HENT:  Negative for congestion, mouth sores, postnasal drip, rhinorrhea and sinus pain.    Eyes:  Negative for photophobia, discharge and redness.   Respiratory:  Negative for cough and shortness of breath.    Cardiovascular:  Negative for chest pain.   Gastrointestinal: Negative.    Endocrine: Negative for polydipsia and polyuria.   Genitourinary:  Negative for difficulty urinating, dysuria, hematuria and urgency.   Neurological:  Negative for headaches.   Psychiatric/Behavioral:  Negative for sleep disturbance.        PAST MEDICAL HISTORY  Past Medical History:   Diagnosis Date    Enlarged prostate        FAMILY HISTORY  Family History   Problem Relation Age of Onset    Cancer Mother         cervical    High Cholesterol Father     Alzheimer's Disease Father        SOCIAL HISTORY  Social History     Socioeconomic History    Marital status:      Spouse name: None    Number of children: 3    Years of education: None    Highest education level: None   Occupational History    Occupation:    Tobacco Use    Smoking status: Never    Smokeless tobacco: Never   Vaping Use    Vaping Use: Never used   Substance and Sexual Activity    Alcohol use:

## 2024-01-14 PROBLEM — E11.9 TYPE 2 DIABETES MELLITUS WITHOUT COMPLICATION, WITHOUT LONG-TERM CURRENT USE OF INSULIN (HCC): Status: ACTIVE | Noted: 2024-01-14

## 2024-02-01 ENCOUNTER — HOSPITAL ENCOUNTER (OUTPATIENT)
Age: 69
Discharge: HOME | End: 2024-02-01
Payer: COMMERCIAL

## 2024-02-01 LAB — PSA SERPL-MCNC: 18.81 NG/ML (ref 0–4)

## 2024-02-01 PROCEDURE — 36415 COLL VENOUS BLD VENIPUNCTURE: CPT

## 2024-02-01 PROCEDURE — 84153 ASSAY OF PSA TOTAL: CPT

## 2024-03-02 ENCOUNTER — HOSPITAL ENCOUNTER (OUTPATIENT)
Age: 69
Discharge: HOME | End: 2024-03-02
Payer: COMMERCIAL

## 2024-03-02 DIAGNOSIS — E78.5 HYPERLIPIDEMIA, UNSPECIFIED HYPERLIPIDEMIA TYPE: ICD-10-CM

## 2024-03-02 DIAGNOSIS — E11.9 TYPE 2 DIABETES MELLITUS WITHOUT COMPLICATION, WITHOUT LONG-TERM CURRENT USE OF INSULIN (HCC): ICD-10-CM

## 2024-03-02 LAB
ALBUMIN SERPL-MCNC: 4.4 G/DL (ref 3.5–5.2)
ALP SERPL-CCNC: 86 U/L (ref 40–129)
ALT SERPL-CCNC: 34 U/L (ref 0–40)
ANION GAP SERPL CALCULATED.3IONS-SCNC: 9 MMOL/L (ref 7–16)
AST SERPL-CCNC: 24 U/L (ref 0–39)
BILIRUB SERPL-MCNC: 0.5 MG/DL (ref 0–1.2)
BUN SERPL-MCNC: 18 MG/DL (ref 6–23)
CALCIUM SERPL-MCNC: 9.5 MG/DL (ref 8.6–10.2)
CHLORIDE SERPL-SCNC: 101 MMOL/L (ref 98–107)
CHOLEST SERPL-MCNC: 146 MG/DL
CO2 SERPL-SCNC: 27 MMOL/L (ref 22–29)
CREAT SERPL-MCNC: 1.1 MG/DL (ref 0.7–1.2)
CREAT UR-MCNC: 107.9 MG/DL (ref 40–278)
GFR SERPL CREATININE-BSD FRML MDRD: >60 ML/MIN/1.73M2
GLUCOSE SERPL-MCNC: 132 MG/DL (ref 74–99)
HBA1C MFR BLD: 6.3 % (ref 4–5.6)
HDLC SERPL-MCNC: 36 MG/DL
LDLC SERPL CALC-MCNC: 92 MG/DL
MICROALBUMIN UR-MCNC: <12 MG/L (ref 0–19)
MICROALBUMIN/CREAT UR-RTO: NORMAL MCG/MG CREAT (ref 0–30)
POTASSIUM SERPL-SCNC: 4.7 MMOL/L (ref 3.5–5)
PROT SERPL-MCNC: 7.4 G/DL (ref 6.4–8.3)
SODIUM SERPL-SCNC: 137 MMOL/L (ref 132–146)
TRIGL SERPL-MCNC: 90 MG/DL
VLDLC SERPL CALC-MCNC: 18 MG/DL

## 2024-03-02 PROCEDURE — 83036 HEMOGLOBIN GLYCOSYLATED A1C: CPT

## 2024-03-02 PROCEDURE — 36415 COLL VENOUS BLD VENIPUNCTURE: CPT

## 2024-03-02 PROCEDURE — 82570 ASSAY OF URINE CREATININE: CPT

## 2024-03-02 PROCEDURE — 80053 COMPREHEN METABOLIC PANEL: CPT

## 2024-03-02 PROCEDURE — 80061 LIPID PANEL: CPT

## 2024-03-02 PROCEDURE — 82043 UR ALBUMIN QUANTITATIVE: CPT

## 2024-03-04 ENCOUNTER — OFFICE VISIT (OUTPATIENT)
Dept: FAMILY MEDICINE CLINIC | Age: 69
End: 2024-03-04
Payer: COMMERCIAL

## 2024-03-04 VITALS
WEIGHT: 195 LBS | BODY MASS INDEX: 27.3 KG/M2 | OXYGEN SATURATION: 98 % | HEART RATE: 61 BPM | HEIGHT: 71 IN | SYSTOLIC BLOOD PRESSURE: 129 MMHG | DIASTOLIC BLOOD PRESSURE: 70 MMHG | TEMPERATURE: 97.2 F | RESPIRATION RATE: 18 BRPM

## 2024-03-04 DIAGNOSIS — E11.9 TYPE 2 DIABETES MELLITUS WITHOUT COMPLICATION, WITHOUT LONG-TERM CURRENT USE OF INSULIN (HCC): Primary | ICD-10-CM

## 2024-03-04 DIAGNOSIS — R97.20 ELEVATED PSA: ICD-10-CM

## 2024-03-04 DIAGNOSIS — E78.5 HYPERLIPIDEMIA, UNSPECIFIED HYPERLIPIDEMIA TYPE: ICD-10-CM

## 2024-03-04 PROBLEM — Z86.16 HISTORY OF COVID-19: Status: RESOLVED | Noted: 2021-09-29 | Resolved: 2024-03-04

## 2024-03-04 PROBLEM — J96.01 ACUTE RESPIRATORY FAILURE WITH HYPOXIA (HCC): Status: RESOLVED | Noted: 2021-08-22 | Resolved: 2024-03-04

## 2024-03-04 PROBLEM — U07.1 PNEUMONIA DUE TO COVID-19 VIRUS: Status: RESOLVED | Noted: 2021-08-22 | Resolved: 2024-03-04

## 2024-03-04 PROBLEM — E11.65 CONTROLLED TYPE 2 DIABETES MELLITUS WITH HYPERGLYCEMIA, WITHOUT LONG-TERM CURRENT USE OF INSULIN (HCC): Status: RESOLVED | Noted: 2021-09-29 | Resolved: 2024-03-04

## 2024-03-04 PROBLEM — R73.03 PREDIABETES: Status: RESOLVED | Noted: 2023-12-02 | Resolved: 2024-03-04

## 2024-03-04 PROBLEM — E11.40 TYPE 2 DIABETES MELLITUS WITH DIABETIC NEUROPATHY (HCC): Status: ACTIVE | Noted: 2024-03-04

## 2024-03-04 PROBLEM — J12.82 PNEUMONIA DUE TO COVID-19 VIRUS: Status: RESOLVED | Noted: 2021-08-22 | Resolved: 2024-03-04

## 2024-03-04 PROCEDURE — 3017F COLORECTAL CA SCREEN DOC REV: CPT | Performed by: FAMILY MEDICINE

## 2024-03-04 PROCEDURE — G8427 DOCREV CUR MEDS BY ELIG CLIN: HCPCS | Performed by: FAMILY MEDICINE

## 2024-03-04 PROCEDURE — G8484 FLU IMMUNIZE NO ADMIN: HCPCS | Performed by: FAMILY MEDICINE

## 2024-03-04 PROCEDURE — 99213 OFFICE O/P EST LOW 20 MIN: CPT | Performed by: FAMILY MEDICINE

## 2024-03-04 PROCEDURE — 2022F DILAT RTA XM EVC RTNOPTHY: CPT | Performed by: FAMILY MEDICINE

## 2024-03-04 PROCEDURE — 1123F ACP DISCUSS/DSCN MKR DOCD: CPT | Performed by: FAMILY MEDICINE

## 2024-03-04 PROCEDURE — 3044F HG A1C LEVEL LT 7.0%: CPT | Performed by: FAMILY MEDICINE

## 2024-03-04 PROCEDURE — 1036F TOBACCO NON-USER: CPT | Performed by: FAMILY MEDICINE

## 2024-03-04 PROCEDURE — G8419 CALC BMI OUT NRM PARAM NOF/U: HCPCS | Performed by: FAMILY MEDICINE

## 2024-03-04 RX ORDER — ROSUVASTATIN CALCIUM 20 MG/1
20 TABLET, COATED ORAL DAILY
Qty: 90 TABLET | Refills: 1 | Status: SHIPPED | OUTPATIENT
Start: 2024-03-04

## 2024-03-04 ASSESSMENT — PATIENT HEALTH QUESTIONNAIRE - PHQ9
SUM OF ALL RESPONSES TO PHQ QUESTIONS 1-9: 0
SUM OF ALL RESPONSES TO PHQ9 QUESTIONS 1 & 2: 0
2. FEELING DOWN, DEPRESSED OR HOPELESS: 0
SUM OF ALL RESPONSES TO PHQ QUESTIONS 1-9: 0
SUM OF ALL RESPONSES TO PHQ QUESTIONS 1-9: 0
1. LITTLE INTEREST OR PLEASURE IN DOING THINGS: 0
SUM OF ALL RESPONSES TO PHQ QUESTIONS 1-9: 0

## 2024-03-04 ASSESSMENT — ENCOUNTER SYMPTOMS
EYE DISCHARGE: 0
SHORTNESS OF BREATH: 0
PHOTOPHOBIA: 0
GASTROINTESTINAL NEGATIVE: 1
RHINORRHEA: 0
SINUS PAIN: 0
COUGH: 0
EYE REDNESS: 0

## 2024-03-04 NOTE — PROGRESS NOTES
with the Crestor.    Elevated PSA  Patient's last PSA was 18 previous 1 was 10.  Patient has had prostate biopsies in the past and does follow on a regular basis with urology.      Return in about 6 months (around 9/4/2024).         Electronically signed by Haroon Miranda DO on 3/4/2024

## 2024-07-22 ENCOUNTER — HOSPITAL ENCOUNTER (EMERGENCY)
Age: 69
Discharge: HOME | End: 2024-07-23
Payer: COMMERCIAL

## 2024-07-22 ENCOUNTER — APPOINTMENT (OUTPATIENT)
Dept: CT IMAGING | Age: 69
End: 2024-07-22
Payer: COMMERCIAL

## 2024-07-22 DIAGNOSIS — R03.0 ELEVATED BLOOD-PRESSURE READING WITHOUT DIAGNOSIS OF HYPERTENSION: Primary | ICD-10-CM

## 2024-07-22 DIAGNOSIS — R51.9 UNILATERAL OCCIPITAL HEADACHE: ICD-10-CM

## 2024-07-22 DIAGNOSIS — S16.1XXA CERVICAL STRAIN, ACUTE, INITIAL ENCOUNTER: ICD-10-CM

## 2024-07-22 LAB
BASOPHILS # BLD: 0.01 K/UL (ref 0–0.2)
BASOPHILS NFR BLD: 0 % (ref 0–2)
EOSINOPHIL # BLD: 0.14 K/UL (ref 0.05–0.5)
EOSINOPHILS RELATIVE PERCENT: 2 % (ref 0–6)
ERYTHROCYTE [DISTWIDTH] IN BLOOD BY AUTOMATED COUNT: 14.1 % (ref 11.5–15)
HCT VFR BLD AUTO: 47.8 % (ref 37–54)
HGB BLD-MCNC: 15.6 G/DL (ref 12.5–16.5)
IMM GRANULOCYTES # BLD AUTO: <0.03 K/UL (ref 0–0.58)
IMM GRANULOCYTES NFR BLD: 0 % (ref 0–5)
LYMPHOCYTES NFR BLD: 2.42 K/UL (ref 1.5–4)
LYMPHOCYTES RELATIVE PERCENT: 38 % (ref 20–42)
MCH RBC QN AUTO: 26.8 PG (ref 26–35)
MCHC RBC AUTO-ENTMCNC: 32.6 G/DL (ref 32–34.5)
MCV RBC AUTO: 82.1 FL (ref 80–99.9)
MONOCYTES NFR BLD: 0.71 K/UL (ref 0.1–0.95)
MONOCYTES NFR BLD: 11 % (ref 2–12)
NEUTROPHILS NFR BLD: 48 % (ref 43–80)
NEUTS SEG NFR BLD: 3.05 K/UL (ref 1.8–7.3)
PLATELET # BLD AUTO: 277 K/UL (ref 130–450)
PMV BLD AUTO: 8.8 FL (ref 7–12)
RBC # BLD AUTO: 5.82 M/UL (ref 3.8–5.8)
WBC OTHER # BLD: 6.4 K/UL (ref 4.5–11.5)

## 2024-07-22 PROCEDURE — 86140 C-REACTIVE PROTEIN: CPT

## 2024-07-22 PROCEDURE — 70450 CT HEAD/BRAIN W/O DYE: CPT

## 2024-07-22 PROCEDURE — 85652 RBC SED RATE AUTOMATED: CPT

## 2024-07-22 PROCEDURE — 80048 BASIC METABOLIC PNL TOTAL CA: CPT

## 2024-07-22 PROCEDURE — 85025 COMPLETE CBC W/AUTO DIFF WBC: CPT

## 2024-07-22 PROCEDURE — 82550 ASSAY OF CK (CPK): CPT

## 2024-07-22 PROCEDURE — 99284 EMERGENCY DEPT VISIT MOD MDM: CPT

## 2024-07-22 RX ORDER — CYCLOBENZAPRINE HCL 10 MG
10 TABLET ORAL ONCE
Status: COMPLETED | OUTPATIENT
Start: 2024-07-22 | End: 2024-07-23

## 2024-07-22 RX ORDER — ACETAMINOPHEN 325 MG/1
650 TABLET ORAL ONCE
Status: COMPLETED | OUTPATIENT
Start: 2024-07-22 | End: 2024-07-23

## 2024-07-23 VITALS
HEART RATE: 53 BPM | RESPIRATION RATE: 18 BRPM | TEMPERATURE: 98.2 F | SYSTOLIC BLOOD PRESSURE: 155 MMHG | DIASTOLIC BLOOD PRESSURE: 74 MMHG | OXYGEN SATURATION: 98 %

## 2024-07-23 LAB
ANION GAP SERPL CALCULATED.3IONS-SCNC: 9 MMOL/L (ref 7–16)
BUN SERPL-MCNC: 28 MG/DL (ref 6–23)
CALCIUM SERPL-MCNC: 9.1 MG/DL (ref 8.6–10.2)
CHLORIDE SERPL-SCNC: 104 MMOL/L (ref 98–107)
CK SERPL-CCNC: 454 U/L (ref 20–200)
CO2 SERPL-SCNC: 25 MMOL/L (ref 22–29)
CREAT SERPL-MCNC: 1.1 MG/DL (ref 0.7–1.2)
CRP SERPL HS-MCNC: 4 MG/L (ref 0–5)
ERYTHROCYTE [SEDIMENTATION RATE] IN BLOOD BY WESTERGREN METHOD: 2 MM/HR (ref 0–15)
GFR, ESTIMATED: 72 ML/MIN/1.73M2
GLUCOSE SERPL-MCNC: 136 MG/DL (ref 74–99)
POTASSIUM SERPL-SCNC: 4.2 MMOL/L (ref 3.5–5)
SODIUM SERPL-SCNC: 138 MMOL/L (ref 132–146)

## 2024-07-23 PROCEDURE — 6370000000 HC RX 637 (ALT 250 FOR IP)

## 2024-07-23 RX ADMIN — ACETAMINOPHEN 650 MG: 325 TABLET ORAL at 00:22

## 2024-07-23 RX ADMIN — CYCLOBENZAPRINE 10 MG: 10 TABLET, FILM COATED ORAL at 00:22

## 2024-07-23 NOTE — DISCHARGE INSTRUCTIONS
Follow-up with your PMD for blood pressure recheck as your blood pressure is elevated today.  Also discussed with him your concerns of leg cramping and night sweats after starting Crestor.      Return to the ER for dull, throbbing headache on one side of the head around the eye or near the temple. Sometimes the pain feels like stabbing or burning. It may also cause jaw pain and vision loss.  This may be signs of giant cell arteritis/temporal arteritis and needs addressed immediately as it may cause long-term vision impairment/vision loss.

## 2024-07-23 NOTE — DISCHARGE INSTR - COC
documented pain score (0-10 scale):    Last Weight:   Wt Readings from Last 1 Encounters:   24 88.5 kg (195 lb)     Mental Status:  {IP PT MENTAL STATUS:}    IV Access:  { BECKY IV ACCESS:459186262}    Nursing Mobility/ADLs:  Walking   {CHP DME ADLs:387532420}  Transfer  {CHP DME ADLs:700506832}  Bathing  {CHP DME ADLs:810596130}  Dressing  {CHP DME ADLs:161265924}  Toileting  {CHP DME ADLs:354645105}  Feeding  {CHP DME ADLs:828566930}  Med Admin  {CHP DME ADLs:544083416}  Med Delivery   { BECKY MED Delivery:445097133}    Wound Care Documentation and Therapy:        Elimination:  Continence:   Bowel: {YES / NO:}  Bladder: {YES / NO:}  Urinary Catheter: {Urinary Catheter:308229899}   Colostomy/Ileostomy/Ileal Conduit: {YES / NO:}       Date of Last BM: ***  No intake or output data in the 24 hours ending 24 0230  No intake/output data recorded.    Safety Concerns:     { BECKY Safety Concerns:668994795}    Impairments/Disabilities:      { BECKY Impairments/Disabilities:109737103}    Nutrition Therapy:  Current Nutrition Therapy:   { BECKY Diet List:686790619}    Routes of Feeding: {CHP DME Other Feedings:435226707}  Liquids: {Slp liquid thickness:81909}  Daily Fluid Restriction: {CHP DME Yes amt example:595739855}  Last Modified Barium Swallow with Video (Video Swallowing Test): {Done Not Done Date:270869977}    Treatments at the Time of Hospital Discharge:   Respiratory Treatments: ***  Oxygen Therapy:  {Therapy; copd oxygen:24234}  Ventilator:    { CC Vent List:813446836}    Rehab Therapies: {THERAPEUTIC INTERVENTION:2662200021}  Weight Bearing Status/Restrictions: { CC Weight Bearin}  Other Medical Equipment (for information only, NOT a DME order):  {EQUIPMENT:745080427}  Other Treatments: ***    Patient's personal belongings (please select all that are sent with patient):  {P DME Belongings:969373108}    RN SIGNATURE:  {Esignature:045714424}    CASE

## 2024-07-23 NOTE — ED NOTES
Independent RAJENDRA Visit          [facility] EMERGENCY DEPARTMENT  EMERGENCY DEPARTMENT ENCOUNTER        Pt Name: Nitesh Roman  MRN: [id number]  Birthdate 1955  Date of evaluation: 7/22/2024  Provider: ANTON Talamantes CNP  PCP: Haroon Miranda DO  Note Started: 11:28 PM EDT 7/22/24    CHIEF COMPLAINT       Chief Complaint   Patient presents with    Headache     headache in the back of patients head, patient states the veins on either side of his temple are bulging       HISTORY OF PRESENT ILLNESS: 1 or more Elements   History from : Patient and chart review  Limitations to history : None    Nitesh Roman is a 69 y.o. male with history of type 2 diabetes mellitus, enlarged prostate, and pneumonia due to COVID-19 virus who presents to the emergency department by private vehicle with his significant other, for evaluation of engorgement of bilateral temporal vessels which started this morning, many hours prior to arrival.  He reportedly awakened with the vessels engorged.  He endorses mild discomfort in the left occipital region and left cervical paraspinal muscles following heavy yard work yesterday which he describes as soreness that has been improving.  Since onset the symptoms have been gradually improving and mild in severity.  He has no other associated symptoms.  He denies any associated nausea, vomiting, photophobia, photopsia, diplopia, hemianopsia, paresthesias, aphasia, mental status changes, vertigo, tinnitus, ataxia, neck stiffness, conjunctival injection, lacrimation, nasal congestion, rhinorrhea, facial sweating, ptosis, eyelid edema, jaw pain, erythema or tenderness overlying the temporal arteries.  The symptoms are aggravated by nothing and relieved by nothing. There has been no history of recent trauma.  Treatment prior to arrival consisted of: nothing with moderate relief of symptoms.  He takes no blood thinning agents. No history of migraines. Headache was

## 2024-07-23 NOTE — ED PROVIDER NOTES
Department of Emergency Medicine  FIRST PROVIDER TRIAGE NOTE             Independent MLP           7/22/24  8:14 PM EDT    Date of Encounter: 7/22/24   MRN: [id number]      HPI: Nitesh Roman is a 69 y.o. male who presents to the ED for evaluation due to concerns that the veins in his temples are larger than normal.  He noticed this today.  He states that he did have some left posterior occipital/neck pain.  No vision changes, erythema, pain, or tenderness to the temporal region.    ROS: Negative for rash or dizziness.    PE: Gen Appearance/Constitutional: alert  HEENT: NC/NT. PERRLA, no temporal tenderness, erythema, or significant edema, temporal veins minimally engorged, airway patent.  Neck: supple    Provider-Patient relationship only established for Provider In Triage (PIT)  Full assessment, HPI and examination not performed, therefore, it is not yet possible to state whether or not an emergency medical condition exists   Focused assessment only during triage. This is not a comprehensive evaluation due to no physical ER space, staff shortage and high numbers of boarding patients in the ER     Initial Plan of Care: All treatment areas with department are currently occupied. Plan to order/Initiate the following while awaiting opening in ED: Proceed toTreatment Area When Bed Available for ED Attending/MLP to Continue Care    Electronically signed by ANTON Talamantes CNP   DD: 7/22/24        Marquita Alba APRN - CNP  07/22/24 2016

## 2024-08-16 LAB — PSA SERPL-MCNC: 13.65 NG/ML (ref 0–4)

## 2024-09-09 ENCOUNTER — OFFICE VISIT (OUTPATIENT)
Dept: FAMILY MEDICINE CLINIC | Age: 69
End: 2024-09-09
Payer: COMMERCIAL

## 2024-09-09 VITALS
HEIGHT: 71 IN | BODY MASS INDEX: 27.3 KG/M2 | HEART RATE: 60 BPM | SYSTOLIC BLOOD PRESSURE: 130 MMHG | TEMPERATURE: 97.8 F | OXYGEN SATURATION: 96 % | WEIGHT: 195 LBS | DIASTOLIC BLOOD PRESSURE: 76 MMHG

## 2024-09-09 DIAGNOSIS — E11.40 TYPE 2 DIABETES MELLITUS WITH DIABETIC NEUROPATHY, WITHOUT LONG-TERM CURRENT USE OF INSULIN (HCC): Primary | ICD-10-CM

## 2024-09-09 DIAGNOSIS — E78.5 HYPERLIPIDEMIA, UNSPECIFIED HYPERLIPIDEMIA TYPE: ICD-10-CM

## 2024-09-09 LAB — HBA1C MFR BLD: 6.5 %

## 2024-09-09 PROCEDURE — G8427 DOCREV CUR MEDS BY ELIG CLIN: HCPCS | Performed by: FAMILY MEDICINE

## 2024-09-09 PROCEDURE — 3044F HG A1C LEVEL LT 7.0%: CPT | Performed by: FAMILY MEDICINE

## 2024-09-09 PROCEDURE — 3017F COLORECTAL CA SCREEN DOC REV: CPT | Performed by: FAMILY MEDICINE

## 2024-09-09 PROCEDURE — 1036F TOBACCO NON-USER: CPT | Performed by: FAMILY MEDICINE

## 2024-09-09 PROCEDURE — 83036 HEMOGLOBIN GLYCOSYLATED A1C: CPT | Performed by: FAMILY MEDICINE

## 2024-09-09 PROCEDURE — 1123F ACP DISCUSS/DSCN MKR DOCD: CPT | Performed by: FAMILY MEDICINE

## 2024-09-09 PROCEDURE — G2211 COMPLEX E/M VISIT ADD ON: HCPCS | Performed by: FAMILY MEDICINE

## 2024-09-09 PROCEDURE — 99214 OFFICE O/P EST MOD 30 MIN: CPT | Performed by: FAMILY MEDICINE

## 2024-09-09 PROCEDURE — G8419 CALC BMI OUT NRM PARAM NOF/U: HCPCS | Performed by: FAMILY MEDICINE

## 2024-09-09 PROCEDURE — 2022F DILAT RTA XM EVC RTNOPTHY: CPT | Performed by: FAMILY MEDICINE

## 2024-09-09 ASSESSMENT — ENCOUNTER SYMPTOMS
EYE REDNESS: 0
SHORTNESS OF BREATH: 0
PHOTOPHOBIA: 0
SINUS PAIN: 0
EYE DISCHARGE: 0
GASTROINTESTINAL NEGATIVE: 1
COUGH: 0
RHINORRHEA: 0

## 2024-09-28 ENCOUNTER — HOSPITAL ENCOUNTER (EMERGENCY)
Age: 69
Discharge: HOME | End: 2024-09-28
Attending: EMERGENCY MEDICINE
Payer: COMMERCIAL

## 2024-09-28 VITALS
DIASTOLIC BLOOD PRESSURE: 76 MMHG | OXYGEN SATURATION: 94 % | BODY MASS INDEX: 27.2 KG/M2 | TEMPERATURE: 98 F | RESPIRATION RATE: 20 BRPM | WEIGHT: 195 LBS | HEART RATE: 88 BPM | SYSTOLIC BLOOD PRESSURE: 180 MMHG

## 2024-09-28 DIAGNOSIS — R33.9 URINARY RETENTION: Primary | ICD-10-CM

## 2024-09-28 LAB
ANION GAP SERPL CALCULATED.3IONS-SCNC: 13 MMOL/L (ref 7–16)
BACTERIA URNS QL MICRO: ABNORMAL
BILIRUB UR QL STRIP: NEGATIVE
BUN SERPL-MCNC: 16 MG/DL (ref 6–23)
CALCIUM SERPL-MCNC: 9.8 MG/DL (ref 8.6–10.2)
CHLORIDE SERPL-SCNC: 100 MMOL/L (ref 98–107)
CLARITY UR: CLEAR
CO2 SERPL-SCNC: 24 MMOL/L (ref 22–29)
COLOR UR: YELLOW
CREAT SERPL-MCNC: 1.2 MG/DL (ref 0.7–1.2)
GFR, ESTIMATED: 68 ML/MIN/1.73M2
GLUCOSE SERPL-MCNC: 169 MG/DL (ref 74–99)
GLUCOSE UR STRIP-MCNC: NEGATIVE MG/DL
HGB UR QL STRIP.AUTO: ABNORMAL
KETONES UR STRIP-MCNC: NEGATIVE MG/DL
LEUKOCYTE ESTERASE UR QL STRIP: NEGATIVE
NITRITE UR QL STRIP: NEGATIVE
PH UR STRIP: 6 [PH] (ref 5–9)
POTASSIUM SERPL-SCNC: 5.6 MMOL/L (ref 3.5–5)
PROT UR STRIP-MCNC: NEGATIVE MG/DL
RBC #/AREA URNS HPF: ABNORMAL /HPF
SODIUM SERPL-SCNC: 137 MMOL/L (ref 132–146)
SP GR UR STRIP: 1.02 (ref 1–1.03)
UROBILINOGEN UR STRIP-ACNC: 0.2 EU/DL (ref 0–1)
WBC #/AREA URNS HPF: ABNORMAL /HPF

## 2024-09-28 PROCEDURE — 99283 EMERGENCY DEPT VISIT LOW MDM: CPT

## 2024-09-28 PROCEDURE — 51702 INSERT TEMP BLADDER CATH: CPT

## 2024-09-28 PROCEDURE — 81001 URINALYSIS AUTO W/SCOPE: CPT

## 2024-09-28 PROCEDURE — 6370000000 HC RX 637 (ALT 250 FOR IP): Performed by: EMERGENCY MEDICINE

## 2024-09-28 PROCEDURE — 80048 BASIC METABOLIC PNL TOTAL CA: CPT

## 2024-09-28 RX ADMIN — SODIUM ZIRCONIUM CYCLOSILICATE 10 G: 10 POWDER, FOR SUSPENSION ORAL at 16:02

## 2024-09-28 ASSESSMENT — PAIN DESCRIPTION - ONSET: ONSET: ON-GOING

## 2024-09-28 ASSESSMENT — PAIN DESCRIPTION - FREQUENCY: FREQUENCY: CONTINUOUS

## 2024-09-28 ASSESSMENT — LIFESTYLE VARIABLES
HOW MANY STANDARD DRINKS CONTAINING ALCOHOL DO YOU HAVE ON A TYPICAL DAY: PATIENT DOES NOT DRINK
HOW OFTEN DO YOU HAVE A DRINK CONTAINING ALCOHOL: NEVER

## 2024-09-28 ASSESSMENT — PAIN DESCRIPTION - ORIENTATION: ORIENTATION: LOWER

## 2024-09-28 ASSESSMENT — PAIN DESCRIPTION - DESCRIPTORS: DESCRIPTORS: ACHING;PRESSURE;SHARP

## 2024-09-28 ASSESSMENT — PAIN - FUNCTIONAL ASSESSMENT
PAIN_FUNCTIONAL_ASSESSMENT: 0-10
PAIN_FUNCTIONAL_ASSESSMENT: NONE - DENIES PAIN

## 2024-09-28 ASSESSMENT — PAIN SCALES - GENERAL: PAINLEVEL_OUTOF10: 10

## 2024-09-28 ASSESSMENT — PAIN DESCRIPTION - PAIN TYPE: TYPE: ACUTE PAIN

## 2024-09-28 ASSESSMENT — PAIN DESCRIPTION - LOCATION: LOCATION: ABDOMEN

## 2024-09-28 NOTE — ED NOTES
Adams bag emptied for 500 cc yellow urine, leg bag applied and patient displayed understanding how to empty and change out a bed bag and leg bag. Wife displayed understanding as well.

## 2024-10-01 ASSESSMENT — ENCOUNTER SYMPTOMS
SHORTNESS OF BREATH: 0
VOMITING: 0
NAUSEA: 0
EYE REDNESS: 0
ABDOMINAL PAIN: 0

## 2024-10-01 NOTE — ED PROVIDER NOTES
[facility] EMERGENCY DEPARTMENT  EMERGENCY DEPARTMENT ENCOUNTER        Pt Name: Nitesh Roman  MRN: [id number]  Birthdate 1955  Date of evaluation: 9/28/2024  Provider: Sharon Leyva DO  PCP: Haroon Miranda DO  Note Started: 9:46 AM EDT 10/1/24    CHIEF COMPLAINT       Chief Complaint   Patient presents with    Urinary Retention     Having issues urinating since yesterday, just dribbles a small amount       HISTORY OF PRESENT ILLNESS: 1 or more Elements       Nitesh Roman is a 69 y.o. male who presents to the emergency department the chief complaint of urinary retention.  The patient states he began yesterday with urinary retention.  He does have frequency, urgency and states he is only getting small dribbles.  He does have suprapubic pressure.  Symptoms are moderate in severity.  Nothing is better.  Nothing supports.  Patient has had no treatment prior to arrival.  He has never had any similar symptoms in the past.    Nursing Notes were all reviewed and agreed with or any disagreements were addressed in the HPI.    REVIEW OF SYSTEMS :      Review of Systems   Constitutional:  Negative for fever.   HENT:  Negative for congestion.    Eyes:  Negative for redness.   Respiratory:  Negative for shortness of breath.    Cardiovascular:  Negative for chest pain.   Gastrointestinal:  Negative for abdominal pain, nausea and vomiting.   Genitourinary:  Positive for decreased urine volume. Negative for dysuria.   Musculoskeletal:  Negative for arthralgias.   Skin:  Negative for rash.   Neurological:  Negative for dizziness.   Psychiatric/Behavioral:  Negative for confusion.    All other systems reviewed and are negative.      Positives and Pertinent negatives as per HPI.     SURGICAL HISTORY   History reviewed. No pertinent surgical history.    CURRENTMEDICATIONS       Discharge Medication List as of 9/28/2024  4:05 PM        CONTINUE these medications which have NOT CHANGED    Details

## 2025-01-27 ENCOUNTER — TELEPHONE (OUTPATIENT)
Dept: FAMILY MEDICINE CLINIC | Age: 70
End: 2025-01-27

## 2025-01-27 DIAGNOSIS — E78.5 HYPERLIPIDEMIA, UNSPECIFIED HYPERLIPIDEMIA TYPE: ICD-10-CM

## 2025-01-27 NOTE — TELEPHONE ENCOUNTER
Name of Medication(s) Requested:  Requested Prescriptions      No prescriptions requested or ordered in this encounter       Medication is on current medication list Yes    Dosage and directions were verified? Yes    Quantity verified: 90 day supply     Pharmacy Verified?  Yes    Last Appointment:  9/9/2024    Future appts:  Future Appointments   Date Time Provider Department Center   3/10/2025  7:00 AM Haroon Miranda DO [facility] DEP        (If no appt send self scheduling link. .REFILLAPPT)  Scheduling request sent?     [] Yes  [x] No    Does patient need updated?  [] Yes  [x] No

## 2025-01-30 RX ORDER — ROSUVASTATIN CALCIUM 20 MG/1
20 TABLET, COATED ORAL DAILY
Qty: 90 TABLET | Refills: 1 | Status: SHIPPED | OUTPATIENT
Start: 2025-01-30

## 2025-03-10 ENCOUNTER — OFFICE VISIT (OUTPATIENT)
Dept: FAMILY MEDICINE CLINIC | Age: 70
End: 2025-03-10
Payer: COMMERCIAL

## 2025-03-10 VITALS
BODY MASS INDEX: 27.58 KG/M2 | OXYGEN SATURATION: 98 % | HEART RATE: 71 BPM | TEMPERATURE: 98.2 F | DIASTOLIC BLOOD PRESSURE: 67 MMHG | WEIGHT: 197 LBS | SYSTOLIC BLOOD PRESSURE: 137 MMHG | HEIGHT: 71 IN

## 2025-03-10 DIAGNOSIS — E78.5 HYPERLIPIDEMIA, UNSPECIFIED HYPERLIPIDEMIA TYPE: Primary | ICD-10-CM

## 2025-03-10 DIAGNOSIS — E11.40 TYPE 2 DIABETES MELLITUS WITH DIABETIC NEUROPATHY, WITHOUT LONG-TERM CURRENT USE OF INSULIN (HCC): ICD-10-CM

## 2025-03-10 LAB — HBA1C MFR BLD: 7 %

## 2025-03-10 PROCEDURE — 3017F COLORECTAL CA SCREEN DOC REV: CPT | Performed by: FAMILY MEDICINE

## 2025-03-10 PROCEDURE — 1036F TOBACCO NON-USER: CPT | Performed by: FAMILY MEDICINE

## 2025-03-10 PROCEDURE — G8427 DOCREV CUR MEDS BY ELIG CLIN: HCPCS | Performed by: FAMILY MEDICINE

## 2025-03-10 PROCEDURE — 83036 HEMOGLOBIN GLYCOSYLATED A1C: CPT | Performed by: FAMILY MEDICINE

## 2025-03-10 PROCEDURE — G2211 COMPLEX E/M VISIT ADD ON: HCPCS | Performed by: FAMILY MEDICINE

## 2025-03-10 PROCEDURE — 3051F HG A1C>EQUAL 7.0%<8.0%: CPT | Performed by: FAMILY MEDICINE

## 2025-03-10 PROCEDURE — 99214 OFFICE O/P EST MOD 30 MIN: CPT | Performed by: FAMILY MEDICINE

## 2025-03-10 PROCEDURE — 36415 COLL VENOUS BLD VENIPUNCTURE: CPT | Performed by: FAMILY MEDICINE

## 2025-03-10 PROCEDURE — G8419 CALC BMI OUT NRM PARAM NOF/U: HCPCS | Performed by: FAMILY MEDICINE

## 2025-03-10 PROCEDURE — 1123F ACP DISCUSS/DSCN MKR DOCD: CPT | Performed by: FAMILY MEDICINE

## 2025-03-10 PROCEDURE — 2022F DILAT RTA XM EVC RTNOPTHY: CPT | Performed by: FAMILY MEDICINE

## 2025-03-10 ASSESSMENT — ENCOUNTER SYMPTOMS
EYE REDNESS: 0
COUGH: 0
RHINORRHEA: 0
GASTROINTESTINAL NEGATIVE: 1
PHOTOPHOBIA: 0
SINUS PAIN: 0
EYE DISCHARGE: 0
SHORTNESS OF BREATH: 0

## 2025-03-10 NOTE — PROGRESS NOTES
3/10/2025    Nitesh Roman    Chief Complaint   Patient presents with    Follow-up    Diabetes       HPI  History was obtained from patient.  Nitesh is a 69 y.o. male who presents today with the followin. Hyperlipidemia, unspecified hyperlipidemia type    2. Type 2 diabetes mellitus with diabetic neuropathy, without long-term current use of insulin ([facility])    Patient presents for follow-up of hyperlipidemia and diabetes.  Patient currently not taking any diabetic medication.  Patient recently had robot-assisted resection of the prostate at the [facility].  The patient states he is doing well although he still has urinary frequency and sometimes incontinence.  Patient has follow-up at the clinic in the next few weeks.     REVIEW OF SYMPTOMS    Review of Systems   Constitutional:  Negative for chills, fatigue and fever.   HENT:  Negative for congestion, mouth sores, postnasal drip, rhinorrhea and sinus pain.    Eyes:  Negative for photophobia, discharge and redness.   Respiratory:  Negative for cough and shortness of breath.    Cardiovascular:  Negative for chest pain.   Gastrointestinal: Negative.    Genitourinary:  Negative for difficulty urinating, dysuria, hematuria and urgency.   Neurological:  Negative for headaches.   Psychiatric/Behavioral:  Negative for sleep disturbance.        PAST MEDICAL HISTORY  Past Medical History:   Diagnosis Date    Controlled type 2 diabetes mellitus with hyperglycemia, without long-term current use of insulin ([facility]) 2021    Enlarged prostate     History of COVID-19 2021    Pneumonia due to COVID-19 virus 2021    Prediabetes 2023       FAMILY HISTORY  Family History   Problem Relation Age of Onset    Cancer Mother         cervical    High Cholesterol Father     Alzheimer's Disease Father        SOCIAL HISTORY  Social History     Socioeconomic History    Marital status:      Spouse name: None    Number of children: 3    Years of education: None

## 2025-03-11 LAB
ALBUMIN: 4.5 G/DL (ref 3.5–5.2)
ALP BLD-CCNC: 90 U/L (ref 40–129)
ALT SERPL-CCNC: 35 U/L (ref 0–40)
ANION GAP SERPL CALCULATED.3IONS-SCNC: 17 MMOL/L (ref 7–16)
AST SERPL-CCNC: 30 U/L (ref 0–39)
BILIRUB SERPL-MCNC: 0.3 MG/DL (ref 0–1.2)
BUN BLDV-MCNC: 19 MG/DL (ref 6–23)
CALCIUM SERPL-MCNC: 9.1 MG/DL (ref 8.6–10.2)
CHLORIDE BLD-SCNC: 107 MMOL/L (ref 98–107)
CHOLESTEROL, TOTAL: 165 MG/DL
CO2: 16 MMOL/L (ref 22–29)
CREAT SERPL-MCNC: 1 MG/DL (ref 0.7–1.2)
GFR, ESTIMATED: 84 ML/MIN/1.73M2
GLUCOSE BLD-MCNC: 135 MG/DL (ref 74–99)
HDLC SERPL-MCNC: 35 MG/DL
LDL CHOLESTEROL: 110 MG/DL
POTASSIUM SERPL-SCNC: 4.6 MMOL/L (ref 3.5–5)
SODIUM BLD-SCNC: 140 MMOL/L (ref 132–146)
TOTAL PROTEIN: 7.6 G/DL (ref 6.4–8.3)
TRIGL SERPL-MCNC: 98 MG/DL
VLDLC SERPL CALC-MCNC: 20 MG/DL

## 2025-08-16 DIAGNOSIS — E78.5 HYPERLIPIDEMIA, UNSPECIFIED HYPERLIPIDEMIA TYPE: ICD-10-CM

## 2025-08-18 RX ORDER — ROSUVASTATIN CALCIUM 20 MG/1
20 TABLET, COATED ORAL DAILY
Qty: 90 TABLET | Refills: 1 | Status: SHIPPED | OUTPATIENT
Start: 2025-08-18